# Patient Record
Sex: MALE | Race: BLACK OR AFRICAN AMERICAN | NOT HISPANIC OR LATINO | Employment: UNEMPLOYED | URBAN - METROPOLITAN AREA
[De-identification: names, ages, dates, MRNs, and addresses within clinical notes are randomized per-mention and may not be internally consistent; named-entity substitution may affect disease eponyms.]

---

## 2019-07-29 ENCOUNTER — TELEPHONE (OUTPATIENT)
Dept: SCHEDULING | Facility: IMAGING CENTER | Age: 59
End: 2019-07-29

## 2019-09-04 ENCOUNTER — OFFICE VISIT (OUTPATIENT)
Dept: MEDICAL GROUP | Facility: MEDICAL CENTER | Age: 59
End: 2019-09-04
Attending: NURSE PRACTITIONER
Payer: MEDICAID

## 2019-09-04 VITALS
RESPIRATION RATE: 16 BRPM | TEMPERATURE: 98.3 F | BODY MASS INDEX: 30.07 KG/M2 | DIASTOLIC BLOOD PRESSURE: 90 MMHG | SYSTOLIC BLOOD PRESSURE: 160 MMHG | HEIGHT: 72 IN | HEART RATE: 88 BPM | OXYGEN SATURATION: 93 % | WEIGHT: 222 LBS

## 2019-09-04 DIAGNOSIS — G89.29 CHRONIC MIDLINE LOW BACK PAIN WITH RIGHT-SIDED SCIATICA: ICD-10-CM

## 2019-09-04 DIAGNOSIS — Z13.21 ENCOUNTER FOR VITAMIN DEFICIENCY SCREENING: ICD-10-CM

## 2019-09-04 DIAGNOSIS — Z13.228 SCREENING FOR METABOLIC DISORDER: ICD-10-CM

## 2019-09-04 DIAGNOSIS — Z11.59 NEED FOR HEPATITIS C SCREENING TEST: ICD-10-CM

## 2019-09-04 DIAGNOSIS — Z76.89 ENCOUNTER TO ESTABLISH CARE: ICD-10-CM

## 2019-09-04 DIAGNOSIS — M54.41 CHRONIC MIDLINE LOW BACK PAIN WITH RIGHT-SIDED SCIATICA: ICD-10-CM

## 2019-09-04 DIAGNOSIS — Z13.6 SCREENING FOR CARDIOVASCULAR CONDITION: ICD-10-CM

## 2019-09-04 DIAGNOSIS — E66.9 OBESITY (BMI 30-39.9): ICD-10-CM

## 2019-09-04 DIAGNOSIS — Z12.11 SCREEN FOR COLON CANCER: ICD-10-CM

## 2019-09-04 DIAGNOSIS — I10 ESSENTIAL HYPERTENSION: ICD-10-CM

## 2019-09-04 DIAGNOSIS — Z13.0 SCREENING FOR DEFICIENCY ANEMIA: ICD-10-CM

## 2019-09-04 PROBLEM — M54.42 CHRONIC MIDLINE LOW BACK PAIN WITH LEFT-SIDED SCIATICA: Status: ACTIVE | Noted: 2019-09-04

## 2019-09-04 PROCEDURE — 99204 OFFICE O/P NEW MOD 45 MIN: CPT | Performed by: NURSE PRACTITIONER

## 2019-09-04 PROCEDURE — 99213 OFFICE O/P EST LOW 20 MIN: CPT | Performed by: NURSE PRACTITIONER

## 2019-09-04 RX ORDER — LISINOPRIL 20 MG/1
20 TABLET ORAL DAILY
Qty: 30 TAB | Refills: 5 | Status: SHIPPED | OUTPATIENT
Start: 2019-09-04 | End: 2020-01-08 | Stop reason: SDUPTHER

## 2019-09-04 RX ORDER — GABAPENTIN 100 MG/1
200 CAPSULE ORAL 3 TIMES DAILY
Qty: 360 CAP | Refills: 2 | Status: SHIPPED | OUTPATIENT
Start: 2019-09-04 | End: 2019-11-06

## 2019-09-04 SDOH — HEALTH STABILITY: MENTAL HEALTH: HOW MANY STANDARD DRINKS CONTAINING ALCOHOL DO YOU HAVE ON A TYPICAL DAY?: 1 OR 2

## 2019-09-04 SDOH — HEALTH STABILITY: MENTAL HEALTH: HOW OFTEN DO YOU HAVE A DRINK CONTAINING ALCOHOL?: 2-3 TIMES A WEEK

## 2019-09-04 ASSESSMENT — ENCOUNTER SYMPTOMS
WEIGHT LOSS: 0
DIARRHEA: 0
COUGH: 0
ABDOMINAL PAIN: 0
FEVER: 0
CONSTIPATION: 0
WHEEZING: 0
BLOOD IN STOOL: 0
PALPITATIONS: 0
CHILLS: 0
SHORTNESS OF BREATH: 0

## 2019-09-04 ASSESSMENT — PATIENT HEALTH QUESTIONNAIRE - PHQ9
CLINICAL INTERPRETATION OF PHQ2 SCORE: 1
SUM OF ALL RESPONSES TO PHQ QUESTIONS 1-9: 4
5. POOR APPETITE OR OVEREATING: 0 - NOT AT ALL

## 2019-09-04 NOTE — ASSESSMENT & PLAN NOTE
Prior PCP: Kevin Del Cid [facility]    Other Providers:  Prior ortho sx - Dr. Abdullahi

## 2019-09-04 NOTE — PROGRESS NOTES
Chief Complaint   Patient presents with   • Annual Wellness Visit       Subjective:     HPI:   Sammy Romo is a 59 y.o. male here to establish care, back pain concerns,  and to discuss the evaluation and management of:      Encounter to establish care  Prior PCP: Kevin Del Cid [facility]    Other Providers:  Prior ortho sx - Dr. Abdullahi    Essential hypertension  Onset: 20 years ago  Current treatment: none, ran out  Treatments tried: lisinopril 40mg  Home blood pressure monitoring: none  Associated symptoms: Denies headache, chest pain, vision changes, palpitations    Chronic midline low back pain with right-sided sciatica  This episode with LBP and significant right sciatica began approx 3 months ago while helping someone move.   Unfortunately he lost access to health care at [facility] no longer takes Medicaid patients and was not able to address his low back pain with sciatica earlier.  He reports that the pain is not so much located in his back, but the sciatica pain that is really bothering him.  He points to the L4-L5 dermatome along his right leg when showing me where his nerve pain is located.  Denies saddle anesthesia and incontinence.  Aggravating factors include ambulation and weightbearing.  Alleviating factors-he reports that hot baths morning and night are the only thing that are currently helping, however he does not have any medications.  He has struggled with low back pain in the past and reports a compression fracture with cementing.       ROS  Review of Systems   Constitutional: Negative for chills, fever, malaise/fatigue and weight loss.   Respiratory: Negative for cough, shortness of breath and wheezing.    Cardiovascular: Negative for chest pain, palpitations and leg swelling.   Gastrointestinal: Negative for abdominal pain, blood in stool, constipation and diarrhea.         No Known Allergies    Current medicines (including changes today)  Current Outpatient Medications   Medication Sig Dispense  Refill   • lisinopril (PRINIVIL) 20 MG Tab Take 1 Tab by mouth every day. 30 Tab 5   • gabapentin (NEURONTIN) 100 MG Cap Take 2 Caps by mouth 3 times a day. 360 Cap 2     No current facility-administered medications for this visit.      He  has a past medical history of Chronic back pain greater than 3 months duration, Hyperlipidemia, and Hypertension.  He  has a past surgical history that includes laminotomy and other orthopedic surgery.  Social History     Tobacco Use   • Smoking status: Never Smoker   • Smokeless tobacco: Never Used   Substance Use Topics   • Alcohol use: Yes     Frequency: 2-3 times a week     Drinks per session: 1 or 2     Comment: socially, moderate   • Drug use: No       Family History   Problem Relation Age of Onset   • Hypertension Maternal Grandfather    • Heart Disease Maternal Grandfather      No family status information on file.       Patient Active Problem List    Diagnosis Date Noted   • Encounter to establish care 09/04/2019   • Essential hypertension 09/04/2019   • Chronic midline low back pain with right-sided sciatica 09/04/2019   • Obesity (BMI 30-39.9) 09/04/2019          Objective:     /90   Pulse 88   Temp 36.8 °C (98.3 °F) (Temporal)   Resp 16   Ht 1.829 m (6')   Wt 100.7 kg (222 lb)   SpO2 93%  Body mass index is 30.11 kg/m².    Physical Exam:  Physical Exam   Constitutional: He is oriented to person, place, and time and well-developed, well-nourished, and in no distress. No distress.   HENT:   Head: Normocephalic.   Right Ear: Tympanic membrane and external ear normal.   Left Ear: Tympanic membrane and external ear normal.   Eyes: Pupils are equal, round, and reactive to light. Conjunctivae and EOM are normal.   Neck: Normal range of motion. Neck supple. No tracheal deviation present.   Cardiovascular: Normal rate, regular rhythm, normal heart sounds and intact distal pulses.   Pulmonary/Chest: Effort normal and breath sounds normal.   Abdominal: Soft. Bowel  sounds are normal.   Musculoskeletal: Normal range of motion.   Lymphadenopathy:        Head (right side): No preauricular adenopathy present.        Head (left side): No preauricular adenopathy present.     He has no cervical adenopathy.   Neurological: He is alert and oriented to person, place, and time. He has normal sensation, normal strength and intact cranial nerves. Gait normal.   Skin: Skin is warm and dry.   Psychiatric: Affect and judgment normal.          Assessment and Plan:     The following treatment plan was discussed:    1. Chronic midline low back pain with right-sided sciatica  gabapentin (NEURONTIN) 100 MG Cap  -We will try Neurontin starting with 100 mg nightly.  I have discussed dosing options with the patient and reported that I am comfortable with him going up to 300 mg as long his he does not find this medication makes him to sleepy.  The patient is agreeable.  He is particularly interested in interventions with physiatry.  He reports he is not interested in pain medication as he does not believe this will solve the problem, I agree.    REFERRAL TO PHYSICAL THERAPY Reason for Therapy: Eval/Treat/Report    REFERRAL TO PHYSIATRY (PMR)   2. Essential hypertension  lisinopril (PRINIVIL) 20 MG Tab  -Patient reports that he did previously have to be on 40 mg of lisinopril when his blood pressure was higher.  I have asked him to bring in approximately 5 blood pressure readings to her next visit in 2 months so we can review the efficacy of lisinopril 20 mg daily.   3. Obesity (BMI 30-39.9)  Patient identified as having weight management issue.  Appropriate orders and counseling given.   4. Encounter to establish care     5. Screening for metabolic disorder  Basic Metabolic Panel    HEMOGLOBIN A1C    MICROALBUMIN CREAT RATIO URINE    TSH WITH REFLEX TO FT4   6. Screening for cardiovascular condition  Lipid Profile   7. Screening for deficiency anemia  CBC WITHOUT DIFFERENTIAL   8. Encounter for  vitamin deficiency screening  VITAMIN D,25 HYDROXY   9. Screen for colon cancer  OCCULT BLOOD FECES IMMUNOASSAY (FIT)   10. Need for hepatitis C screening test  HEP C VIRUS ANTIBODY       Any change or worsening of signs or symptoms, patient encouraged to follow-up or report to emergency room for further evaluation. Patient verbalizes understanding and agrees.    Follow-Up: Return in about 2 months (around 11/4/2019).  I will recheck the patient with lab results.      PLEASE NOTE: This dictation was created using voice recognition software. I have made every reasonable attempt to correct obvious errors, but I expect that there are errors of grammar and possibly content that I did not discover before finalizing the note.

## 2019-09-04 NOTE — ASSESSMENT & PLAN NOTE
Onset: 20 years ago  Current treatment: none, ran out  Treatments tried: lisinopril 40mg  Home blood pressure monitoring: none  Associated symptoms: Denies headache, chest pain, vision changes, palpitations

## 2019-09-04 NOTE — ASSESSMENT & PLAN NOTE
This episode with LBP and significant right sciatica began approx 3 months ago while helping someone move.   Unfortunately he lost access to health care at [facility] no longer takes Medicaid patients and was not able to address his low back pain with sciatica earlier.  He reports that the pain is not so much located in his back, but the sciatica pain that is really bothering him.  He points to the L4-L5 dermatome along his right leg when showing me where his nerve pain is located.  Denies saddle anesthesia and incontinence.  Aggravating factors include ambulation and weightbearing.  Alleviating factors-he reports that hot baths morning and night are the only thing that are currently helping, however he does not have any medications.  He has struggled with low back pain in the past and reports a compression fracture with cementing.

## 2019-09-25 ENCOUNTER — HOSPITAL ENCOUNTER (OUTPATIENT)
Dept: LAB | Facility: MEDICAL CENTER | Age: 59
End: 2019-09-25
Attending: NURSE PRACTITIONER
Payer: MEDICAID

## 2019-09-25 DIAGNOSIS — Z11.59 NEED FOR HEPATITIS C SCREENING TEST: ICD-10-CM

## 2019-09-25 DIAGNOSIS — Z13.6 SCREENING FOR CARDIOVASCULAR CONDITION: ICD-10-CM

## 2019-09-25 DIAGNOSIS — Z13.228 SCREENING FOR METABOLIC DISORDER: ICD-10-CM

## 2019-09-25 DIAGNOSIS — Z13.0 SCREENING FOR DEFICIENCY ANEMIA: ICD-10-CM

## 2019-09-25 DIAGNOSIS — Z13.21 ENCOUNTER FOR VITAMIN DEFICIENCY SCREENING: ICD-10-CM

## 2019-09-25 LAB
25(OH)D3 SERPL-MCNC: 4 NG/ML (ref 30–100)
ANION GAP SERPL CALC-SCNC: 7 MMOL/L (ref 0–11.9)
BUN SERPL-MCNC: 14 MG/DL (ref 8–22)
CALCIUM SERPL-MCNC: 9.5 MG/DL (ref 8.5–10.5)
CHLORIDE SERPL-SCNC: 104 MMOL/L (ref 96–112)
CHOLEST SERPL-MCNC: 183 MG/DL (ref 100–199)
CO2 SERPL-SCNC: 30 MMOL/L (ref 20–33)
CREAT SERPL-MCNC: 0.94 MG/DL (ref 0.5–1.4)
ERYTHROCYTE [DISTWIDTH] IN BLOOD BY AUTOMATED COUNT: 46.1 FL (ref 35.9–50)
EST. AVERAGE GLUCOSE BLD GHB EST-MCNC: 128 MG/DL
GLUCOSE SERPL-MCNC: 95 MG/DL (ref 65–99)
HBA1C MFR BLD: 6.1 % (ref 0–5.6)
HCT VFR BLD AUTO: 44 % (ref 42–52)
HCV AB SER QL: NEGATIVE
HDLC SERPL-MCNC: 42 MG/DL
HGB BLD-MCNC: 13.7 G/DL (ref 14–18)
LDLC SERPL CALC-MCNC: 116 MG/DL
MCH RBC QN AUTO: 28.1 PG (ref 27–33)
MCHC RBC AUTO-ENTMCNC: 31.1 G/DL (ref 33.7–35.3)
MCV RBC AUTO: 90.3 FL (ref 81.4–97.8)
PLATELET # BLD AUTO: 211 K/UL (ref 164–446)
PMV BLD AUTO: 12.3 FL (ref 9–12.9)
POTASSIUM SERPL-SCNC: 4 MMOL/L (ref 3.6–5.5)
RBC # BLD AUTO: 4.87 M/UL (ref 4.7–6.1)
SODIUM SERPL-SCNC: 141 MMOL/L (ref 135–145)
TRIGL SERPL-MCNC: 126 MG/DL (ref 0–149)
TSH SERPL DL<=0.005 MIU/L-ACNC: 2.34 UIU/ML (ref 0.38–5.33)
WBC # BLD AUTO: 4.6 K/UL (ref 4.8–10.8)

## 2019-09-25 PROCEDURE — 85027 COMPLETE CBC AUTOMATED: CPT

## 2019-09-25 PROCEDURE — 86803 HEPATITIS C AB TEST: CPT

## 2019-09-25 PROCEDURE — 83036 HEMOGLOBIN GLYCOSYLATED A1C: CPT

## 2019-09-25 PROCEDURE — 36415 COLL VENOUS BLD VENIPUNCTURE: CPT

## 2019-09-25 PROCEDURE — 84443 ASSAY THYROID STIM HORMONE: CPT

## 2019-09-25 PROCEDURE — 82306 VITAMIN D 25 HYDROXY: CPT

## 2019-09-25 PROCEDURE — 80061 LIPID PANEL: CPT

## 2019-09-25 PROCEDURE — 80048 BASIC METABOLIC PNL TOTAL CA: CPT

## 2019-09-27 NOTE — OP THERAPY EVALUATION
"  Outpatient Physical Therapy  INITIAL EVALUATION    [facility]  [address]  [address]  [place] [address]  Phone:  [phone]  Fax:  [phone]    Date of Evaluation: 09/30/2019    Patient: Sammy Romo  YOB: 1960  MRN: [id number]     Referring Provider: PREMA Peters  [address]  [address]  [place] [address]   Referring Diagnosis Lumbago with sciatica, right side [M54.41];Other chronic pain [G89.29]     Time Calculation  Start time: 1330  Stop time: 1430 Time Calculation (min): 60 minutes       Physical Therapy Occurrence Codes    Date of onset of impairment:  9/30/12   Date physical therapy care plan established or reviewed:  9/30/19   Date physical therapy treatment started:  9/30/19          Chief Complaint: Back Problem    Visit Diagnoses     ICD-10-CM   1. Chronic midline low back pain with right-sided sciatica M54.41    G89.29         Subjective:   History of Present Illness:     Date of onset:  9/30/2012    Mechanism of injury:  Patient is a 59 year old male with a PMH including: Essential HTN, hyperlipidemia, obesity, and chronic midline low back pain with right sided sciatica. Has a surgical history of: Laminotomy and lumbar vertebroplasty.     Pt presents to therapy with crutches and  complaints of R LBP with right sided sciatica to R ankle with intermittent radiation to R shoulder. Pt has a hx of LBP since 2012 where he sustained his first compression fracture injury at [place] while helping to move supplies. Pt c/o hx of R LBP with intermittent RLE radiating pain without numbness/tingling; RLE radicular symptoms have become consistent over past 6 months.      Pt reports hx of compression fracture surgeries (2016, 2017, 2018). \"I got relief after the surgeries but it was short lived.\" \"I'm expecting another back surgery. They sent in auth for medicaid for another surgery where they are going to go in and re-do everything.  I've had three back " "surgeries and had physical therapy every time and it didn't work. I had to establish a new primary care. I'm basically here because my doctor wants me to be and I'm going through the motions.\"     Patient's next PCP appointment is on 10/6.         Quality of life:  Poor  Prior level of function:  I've been on disability for the past 8 months.   Sleep disturbance:  Interrupted sleep (Sleeps L side lying )  # Times/Night awakened:  4  Pain:     Current pain ratin    Quality:  Sharp, radiating and burning    Pain timing:  Continuously    Pain Comments::  Aggravating: Prolonged standing >2 min, walking, transfers    Relieving: Hot baths in the morning and night, Gabapentin (20-30% relief)  Social Support:     Lives in:  Apartment (Bottom floor)    Lives with: Friend.  Hand dominance:  Right  Diagnostic Tests:     Diagnostic Tests Comments:  Imaging; Most recent imaging from 3 years ago in Epic system  Lumbar spine x ray:   1. Age-indeterminate T12 compression deformity.  2.  Mild degenerative disc disease.    Pt reports he has had all imaging/records and surgeries (below) completed at [facility].   Completed surgeries at [facility] (2016, 2017, 2018)  Back x ray (2019)  Bone density (within past 3 years)  Compression fracture (6 months ago)    Will attempt to obtain imaging and OP reports  Treatments:     Previous treatment:  Physical therapy (Surgeries)    Treatment Comments:  Physical therapy  X 2 episodes  Activities of Daily Living:     Patient reported ADL status: Patient's current daily routine includes staying at home. \"I live at my friend's house, and she helps me with everything.\" She assists pt with meal preparing, grocery shopping, and laundry. Pt is currently able to complete self care such as grooming and toileting independently. Pt reports he is unable to shower due to pain with prolonged standing and instead takes baths. \"Usually I can set up my own baths too.\" Reports bathtub is handicap " "accessible but toilet does not have bars. Pt reports he received his crutches from a friend who was \"tired of seeing me walk like this.\" Per pt, uses the crutches with all ambulation including indoors at times but does not require crutches when ambulating short distances such as going to the bathroom.     Patient Goals:     Other patient goals:  \"I'm here because I'm going through the motions\"      Past Medical History:   Diagnosis Date   • Chronic back pain greater than 3 months duration    • Hyperlipidemia    • Hypertension      Past Surgical History:   Procedure Laterality Date   • LAMINOTOMY     • OTHER ORTHOPEDIC SURGERY      lumbar vertebroplasty     Social History     Tobacco Use   • Smoking status: Never Smoker   • Smokeless tobacco: Never Used   Substance Use Topics   • Alcohol use: Yes     Frequency: 2-3 times a week     Drinks per session: 1 or 2     Comment: socially, moderate     Family and Occupational History     Socioeconomic History   • Marital status: Single     Spouse name: Not on file   • Number of children: Not on file   • Years of education: Not on file   • Highest education level: Not on file   Occupational History   • Not on file       Objective     Postural Observations  Seated posture: poor    Additional Postural Observation Details  Pt seated in chair throughout entire evaluation. \"I'm sure you have the best intentions, but I know my body and there's nothing you can make me do that won't cause me severe pain.\"    Hip Screen   Hip range of motion within functional limits with the following exceptions: L hip flexion and L knee extension AROM WFL while seated in chair. L ankle PF/DF decreased.   R hip flexion, R knee extension and R ankle PF/DF AROM < 1/2 ROM due to pt's fear of movement. \"I'm not gonna do it all the way because it hurts.\" Inability to assess accurately at this time.   Hip strength within functional limits with the following exceptions: Deferred due to pt's refusal.   \"I " "can't bend down to reach anything, that's funny.\"    Neurological Testing     Reflexes   Left   Patellar (L4): absent (0)  Achilles (S1): absent (0)    Right   Patellar (L4): absent (0)  Achilles (S1): absent (0)    Dermatome testing   Lumbar (left)   All left lumbar dermatomes intact    Lumbar (right)   All right lumbar dermatomes intact    Active Range of Motion     Additional Active Range of Motion Details  Deferred due to pt's refusal.     Functional Assessment     Comments  Transfers:  Pt asked to demonstrate sit>stand as he does at home without crutches. Pt unable to get out of chair with armrests and requires crutches; difficult to determine whether due to fear or lack of strength as unable to test strength this session.     Gait: Pt ambulates with bradykinetic gait and forward flexed posture; bears significant weight on BUE's.         Therapeutic Exercises (CPT 50352):       Therapeutic Exercise Summary: Pt education: Pt educated about importance of maintaining movement in safe, gentle manner to prevent deconditioning, bone fragility, and LB stiffness. Educated about physical therapy attempting to improve pt's mobility and decrease pain with mobility. Educated about gentle strengthening and conditioning to prevent future compression fractures.       Time-based treatments/modalities:          Assessment, Response and Plan:   Impairments: abnormal or restricted ROM, activity intolerance, impaired physical strength, lacks appropriate home exercise program, limited ADL's, limited mobility and pain with function    Assessment details:  Patient has a complex medical hx involving several compression fractures and surgeries (see above). Pt presents to therapy with crutches and  complaints of R LBP with right sided sciatica to R ankle with intermittent radiation to R shoulder. Pt has a hx of LBP since 2012 where he sustained his first compression fracture injury at [facility] while helping to move supplies. Pt c/o hx of R " LBP with intermittent RLE radiating pain without numbness/tingling; RLE radicular symptoms have become consistent over past 6 months.  Pt presents with high fear avoidance behaviors due to pain. Difficult to assess pt fully during evaluation due to these behaviors and pt refusal due to fear of pain. Provided pt education today regarding pain neuroscience and role of PT in functional mobility. Pt may benefit from a trial skilled physical therapy in order to address above impairments in order to improve QOL and return to reported ADL's.     Barriers to therapy:  Psychosocial, non-compliant with treatment regimen and poorly tolerated treatments  Other barriers to therapy:  High fear avoidance behaviors  Prognosis: poor    Prognosis details:  Poor based on high fear avoidance behaviors and past medical hx of multiple compression fractures, multiple surgeries, and failed attempts in PT.  Goals:   Short Term Goals:   1. Pt will be independent with written HEP.    Short term goal time span:  2-4 weeks      Long Term Goals:    1. Pt will be independent with written HEP.  2. Pt will have a RMQ score improvement of 20% (eval:95.83)  3. Pt will be able to stand for 10 min with modification without increase in symptoms in order to perform self care/grooming 50% of the time or greater.  4. Pt will perform sit>stand with modification without increase in symptoms in order to improve QOL 50% of the time or greater.  5. Pt will ambulate 30 ft with modification without increase in symptoms in order to improve QOL and complete ADL's such as using the restroom 50% of the time or greater.     Long term goal time span:  4-6 weeks    Plan:   Therapy options:  Physical therapy treatment to continue  Planned therapy interventions:  Neuromuscular Re-education (CPT 64922), Therapeutic Exercise (CPT 78692) and E Stim Unattended (CPT 21382)  Frequency:  1x week  Duration in visits:  6  Discussed with:  Patient  Plan details:  UPOC:  12/6/19  1x/wk for 6 weeks  Pain neuroscience re-education  L/S stabilization program        Functional Assessment Used  Swapnil Connor Low Back Pain and Disability Score: 95.83      Kristopher Dyson, PT, DPT, [facility]      Referring provider co-signature:  I have reviewed this plan of care and my co-signature certifies the need for services.    Certification Dates:   From  9/30/19 To 12/6/19      Physician Signature: ________________________________ Date: ______________

## 2019-09-30 ENCOUNTER — TELEPHONE (OUTPATIENT)
Dept: MEDICAL GROUP | Facility: MEDICAL CENTER | Age: 59
End: 2019-09-30

## 2019-09-30 ENCOUNTER — PHYSICAL THERAPY (OUTPATIENT)
Dept: PHYSICAL THERAPY | Facility: REHABILITATION | Age: 59
End: 2019-09-30
Attending: NURSE PRACTITIONER
Payer: MEDICAID

## 2019-09-30 DIAGNOSIS — M54.41 CHRONIC MIDLINE LOW BACK PAIN WITH RIGHT-SIDED SCIATICA: ICD-10-CM

## 2019-09-30 DIAGNOSIS — G89.29 CHRONIC MIDLINE LOW BACK PAIN WITH RIGHT-SIDED SCIATICA: ICD-10-CM

## 2019-09-30 PROCEDURE — 97163 PT EVAL HIGH COMPLEX 45 MIN: CPT

## 2019-09-30 RX ORDER — CYCLOBENZAPRINE HCL 5 MG
5-10 TABLET ORAL 3 TIMES DAILY PRN
Qty: 45 TAB | Refills: 1 | Status: SHIPPED | OUTPATIENT
Start: 2019-09-30 | End: 2019-11-06

## 2019-09-30 RX ORDER — ERGOCALCIFEROL 1.25 MG/1
50000 CAPSULE ORAL
Qty: 12 CAP | Refills: 1 | Status: SHIPPED | OUTPATIENT
Start: 2019-09-30 | End: 2019-11-06 | Stop reason: SDUPTHER

## 2019-09-30 SDOH — ECONOMIC STABILITY: GENERAL: QUALITY OF LIFE: POOR

## 2019-09-30 ASSESSMENT — ENCOUNTER SYMPTOMS
AWAKENINGS PER NIGHT: 4
PAIN TIMING: CONTINUOUSLY
QUALITY: RADIATING
QUALITY: BURNING
QUALITY: SHARP
PAIN SCALE: 8

## 2019-09-30 NOTE — TELEPHONE ENCOUNTER
----- Message from PREMA Peters sent at 9/30/2019  7:24 AM PDT -----  Please call pt and let them know that I received his lab results.  He is negative for hepatitis C.  His thyroid function is normal.  His vitamin D level is very low so I will be calling in a weekly vitamin D prescription.  He will only take this medication once a week and we will check his lab in several months.  His average blood sugar is elevated placing him as a prediabetic.  I would like to see him increase his physical activity and decrease his amount of carbohydrate intake, we will recheck his labs in 3-4 months to see how these lifestyle changes have impacted his average blood sugar.  Please asked the patient to schedule appointment in approximately 4 months.  His LDL or bad cholesterol is elevated.  He can improve this number also by increasing his physical activity, decreasing the amount of saturated fat and cholesterol he eats, and increasing the amount of healthy fats like all of oil, avocado, salmon, nuts.  If he has any questions or concerns please asked him to reach  out or schedule an appointment.  Thank you

## 2019-09-30 NOTE — TELEPHONE ENCOUNTER
Relayed results to pt, he verbalized understanding. Pt did state that the gabapentin has given him some relief, but would like to still have an rx for Flexeril. Please advise, thank you

## 2019-09-30 NOTE — RESULT ENCOUNTER NOTE
Please call pt and let them know that I received his lab results.  He is negative for hepatitis C.  His thyroid function is normal.  His vitamin D level is very low so I will be calling in a weekly vitamin D prescription.  He will only take this medication once a week and we will check his lab in several months.  His average blood sugar is elevated placing him as a prediabetic.  I would like to see him increase his physical activity and decrease his amount of carbohydrate intake, we will recheck his labs in 3-4 months to see how these lifestyle changes have impacted his average blood sugar.  Please asked the patient to schedule appointment in approximately 4 months.  His LDL or bad cholesterol is elevated.  He can improve this number also by increasing his physical activity, decreasing the amount of saturated fat and cholesterol he eats, and increasing the amount of healthy fats like all of oil, avocado, salmon, nuts.  If he has any questions or concerns please asked him to reach  out or schedule an appointment.  Thank you

## 2019-09-30 NOTE — PROGRESS NOTES
Called and Flexeril for low back pain.  Medication was discussed at last visit.  We had initiated Neurontin, patient reports some relief but is interested in Flexeril as well.

## 2019-10-14 ENCOUNTER — APPOINTMENT (OUTPATIENT)
Dept: PHYSICAL THERAPY | Facility: REHABILITATION | Age: 59
End: 2019-10-14
Attending: NURSE PRACTITIONER
Payer: MEDICAID

## 2019-10-21 ENCOUNTER — APPOINTMENT (OUTPATIENT)
Dept: PHYSICAL THERAPY | Facility: REHABILITATION | Age: 59
End: 2019-10-21
Attending: NURSE PRACTITIONER
Payer: MEDICAID

## 2019-10-23 ENCOUNTER — APPOINTMENT (OUTPATIENT)
Dept: PHYSICAL THERAPY | Facility: REHABILITATION | Age: 59
End: 2019-10-23
Attending: NURSE PRACTITIONER
Payer: MEDICAID

## 2019-10-28 ENCOUNTER — APPOINTMENT (OUTPATIENT)
Dept: PHYSICAL THERAPY | Facility: REHABILITATION | Age: 59
End: 2019-10-28
Attending: NURSE PRACTITIONER
Payer: MEDICAID

## 2019-11-04 ENCOUNTER — APPOINTMENT (OUTPATIENT)
Dept: PHYSICAL THERAPY | Facility: REHABILITATION | Age: 59
End: 2019-11-04
Attending: NURSE PRACTITIONER
Payer: MEDICAID

## 2019-11-06 ENCOUNTER — APPOINTMENT (OUTPATIENT)
Dept: PHYSICAL THERAPY | Facility: REHABILITATION | Age: 59
End: 2019-11-06
Attending: NURSE PRACTITIONER
Payer: MEDICAID

## 2019-11-06 ENCOUNTER — OFFICE VISIT (OUTPATIENT)
Dept: MEDICAL GROUP | Facility: MEDICAL CENTER | Age: 59
End: 2019-11-06
Attending: NURSE PRACTITIONER
Payer: MEDICAID

## 2019-11-06 VITALS
SYSTOLIC BLOOD PRESSURE: 128 MMHG | DIASTOLIC BLOOD PRESSURE: 80 MMHG | HEIGHT: 72 IN | WEIGHT: 219.4 LBS | RESPIRATION RATE: 16 BRPM | BODY MASS INDEX: 29.72 KG/M2 | TEMPERATURE: 97.4 F | HEART RATE: 88 BPM | OXYGEN SATURATION: 97 %

## 2019-11-06 DIAGNOSIS — G89.29 CHRONIC MIDLINE LOW BACK PAIN WITH RIGHT-SIDED SCIATICA: ICD-10-CM

## 2019-11-06 DIAGNOSIS — M54.41 CHRONIC MIDLINE LOW BACK PAIN WITH RIGHT-SIDED SCIATICA: ICD-10-CM

## 2019-11-06 DIAGNOSIS — I10 ESSENTIAL HYPERTENSION: ICD-10-CM

## 2019-11-06 DIAGNOSIS — F33.1 MODERATE EPISODE OF RECURRENT MAJOR DEPRESSIVE DISORDER (HCC): ICD-10-CM

## 2019-11-06 PROBLEM — F32.9 MAJOR DEPRESSIVE DISORDER: Status: ACTIVE | Noted: 2019-11-06

## 2019-11-06 PROCEDURE — 99214 OFFICE O/P EST MOD 30 MIN: CPT | Performed by: NURSE PRACTITIONER

## 2019-11-06 PROCEDURE — 99213 OFFICE O/P EST LOW 20 MIN: CPT | Performed by: NURSE PRACTITIONER

## 2019-11-06 RX ORDER — CYCLOBENZAPRINE HCL 10 MG
10 TABLET ORAL 3 TIMES DAILY PRN
Qty: 90 TAB | Refills: 3 | Status: SHIPPED | OUTPATIENT
Start: 2019-11-06 | End: 2020-08-07 | Stop reason: SDUPTHER

## 2019-11-06 RX ORDER — ERGOCALCIFEROL 1.25 MG/1
50000 CAPSULE ORAL
Qty: 12 CAP | Refills: 1 | Status: SHIPPED | OUTPATIENT
Start: 2019-11-06 | End: 2020-06-11 | Stop reason: SDUPTHER

## 2019-11-06 RX ORDER — GABAPENTIN 300 MG/1
300 CAPSULE ORAL 3 TIMES DAILY
Qty: 90 CAP | Refills: 5 | Status: SHIPPED | OUTPATIENT
Start: 2019-11-06 | End: 2020-06-11 | Stop reason: SDUPTHER

## 2019-11-06 ASSESSMENT — ENCOUNTER SYMPTOMS
ABDOMINAL PAIN: 0
CONSTIPATION: 0
FEVER: 0
MYALGIAS: 1
COUGH: 0
CHILLS: 0
SHORTNESS OF BREATH: 0
WHEEZING: 0
BLOOD IN STOOL: 0
BACK PAIN: 1
WEIGHT LOSS: 0
DIARRHEA: 0
PALPITATIONS: 0

## 2019-11-06 ASSESSMENT — PATIENT HEALTH QUESTIONNAIRE - PHQ9
SUM OF ALL RESPONSES TO PHQ QUESTIONS 1-9: 19
5. POOR APPETITE OR OVEREATING: 1 - SEVERAL DAYS
CLINICAL INTERPRETATION OF PHQ2 SCORE: 4

## 2019-11-06 NOTE — ASSESSMENT & PLAN NOTE
Checked his BP at a store and got 140/90 and 132/82. He has been taking his lisinopril.  Denies HA and vision changes.

## 2019-11-06 NOTE — PROGRESS NOTES
Chief Complaint   Patient presents with   • Follow-Up   • Results   • Hypertension       Subjective:     HPI:   Sammy Romo is a 59 y.o. male here to discuss the evaluation and management of:        Essential hypertension  Checked his BP at a store and got 140/90 and 132/82. He has been taking his lisinopril.  Denies HA and vision changes.     Chronic midline low back pain with right-sided sciatica  Neurontin and flexeril are helping.  He is off the crutches.  He has a caregiver that was initally full time, but is now doing more on his own.  He takes the flexeril 10mg about TID, it helps.  He reports the gabapentin made a huge difference, but is not working quite as well anymore.  He still does have right-sided sciatica, but the gabapentin helps considerably.  Denies saddle anesthesia and incontinence.    Major depressive disorder  PHQ 9 score today was 19.  Patient has been struggling with low energy, disinterest, slow movement, and fatigue.  He denies HI and SI.  He reports that his pain and financial situation add to his depression.      ROS  Review of Systems   Constitutional: Negative for chills, fever, malaise/fatigue and weight loss.   Respiratory: Negative for cough, shortness of breath and wheezing.    Cardiovascular: Negative for chest pain, palpitations and leg swelling.   Gastrointestinal: Negative for abdominal pain, blood in stool, constipation and diarrhea.   Musculoskeletal: Positive for back pain, joint pain and myalgias.         No Known Allergies    Current medicines (including changes today)  Current Outpatient Medications   Medication Sig Dispense Refill   • sertraline (ZOLOFT) 50 MG Tab Take 1 Tab by mouth every day. Take 1/2 tab once daily for the first week.  Then take one pill once daily. 30 Tab 2   • gabapentin (NEURONTIN) 300 MG Cap Take 1 Cap by mouth 3 times a day. 90 Cap 5   • ergocalciferol (DRISDOL) 35880 UNIT capsule Take 1 Cap by mouth every 7 days. 12 Cap 1   • cyclobenzaprine  (FLEXERIL) 10 MG Tab Take 1 Tab by mouth 3 times a day as needed. 90 Tab 3   • lisinopril (PRINIVIL) 20 MG Tab Take 1 Tab by mouth every day. 30 Tab 5     No current facility-administered medications for this visit.        Social History     Tobacco Use   • Smoking status: Never Smoker   • Smokeless tobacco: Never Used   Substance Use Topics   • Alcohol use: Yes     Frequency: 2-3 times a week     Drinks per session: 1 or 2     Comment: socially, moderate   • Drug use: No       Patient Active Problem List    Diagnosis Date Noted   • Major depressive disorder 11/06/2019   • Encounter to establish care 09/04/2019   • Essential hypertension 09/04/2019   • Chronic midline low back pain with right-sided sciatica 09/04/2019   • Obesity (BMI 30-39.9) 09/04/2019       Family History   Problem Relation Age of Onset   • Hypertension Maternal Grandfather    • Heart Disease Maternal Grandfather           Objective:     /80 (BP Location: Left arm, Patient Position: Sitting, BP Cuff Size: Adult)   Pulse 88   Temp 36.3 °C (97.4 °F)   Resp 16   Ht 1.829 m (6')   Wt 99.5 kg (219 lb 6.4 oz)   SpO2 97%  Body mass index is 29.76 kg/m².    Physical Exam:  Physical Exam   Constitutional: He is oriented to person, place, and time and well-developed, well-nourished, and in no distress. No distress.   HENT:   Head: Normocephalic.   Right Ear: Tympanic membrane and external ear normal.   Left Ear: Tympanic membrane and external ear normal.   Eyes: Pupils are equal, round, and reactive to light. Conjunctivae and EOM are normal.   Neck: Normal range of motion. Neck supple. No tracheal deviation present.   Cardiovascular: Normal rate, regular rhythm, normal heart sounds and intact distal pulses.   Pulmonary/Chest: Effort normal and breath sounds normal.   Abdominal: Soft. Bowel sounds are normal.   Musculoskeletal: Normal range of motion.   Lymphadenopathy:        Head (right side): No preauricular adenopathy present.        Head  (left side): No preauricular adenopathy present.     He has no cervical adenopathy.   Neurological: He is alert and oriented to person, place, and time. He has normal sensation, normal strength and intact cranial nerves. Gait normal.   Skin: Skin is warm and dry.   Psychiatric: Affect and judgment normal.       Assessment and Plan:     The following treatment plan was discussed:    1. Essential hypertension  - continue lisinopril.  I have asked the patient to check his blood pressure in the community several times between now and her follow-up appointment in 1 month.  We will consider adding another agent or increasing his lisinopril dose at the next visit if he does have elevated readings.  I would also like to get better control of his pain as that may be a contributing factor.   2. Chronic midline low back pain with right-sided sciatica  gabapentin (NEURONTIN) 300 MG Cap  -We will increase his Neurontin dose to 300 mg 3 times daily.  He did see some benefit with 200 mg daily, although the benefit has slightly decreased.  He denies somnolence.  I have increased his Flexeril dose to 10 mg 3 times daily as needed muscle spasm/pain as he finds to 10 mg dose more effective and nonsedating.    cyclobenzaprine (FLEXERIL) 10 MG Tab   3. Moderate episode of recurrent major depressive disorder (HCC)  Patient has been identified as having a positive depression screening. Appropriate orders and counseling have been given.    sertraline (ZOLOFT) 50 MG Tab  -We will initiate zoloft today for his depression.  I will take a half a tab daily for 1 week, then increase to a full tab daily.  We will follow-up in 1 month to check on the efficacy of this medication.  I also provided the patient with [place] information as transportation stress adds to his depression.  Also hopefully finding better control of his pain will improve his mood.  Counseling offered, patient not interested at this time.       Any change or worsening of signs or  symptoms, patient encouraged to follow-up or report to emergency room for further evaluation. Patient verbalizes understanding and agrees.    Follow-Up: Return in about 4 weeks (around 12/4/2019) for Hypertension, Depression.      PLEASE NOTE: This dictation was created using voice recognition software. I have made every reasonable attempt to correct obvious errors, but I expect that there are errors of grammar and possibly content that I did not discover before finalizing the note.

## 2019-11-06 NOTE — ASSESSMENT & PLAN NOTE
PHQ 9 score today was 19.  Patient has been struggling with low energy, disinterest, slow movement, and fatigue.  He denies HI and SI.  He reports that his pain and financial situation add to his depression.

## 2019-11-11 ENCOUNTER — APPOINTMENT (OUTPATIENT)
Dept: PHYSICAL THERAPY | Facility: REHABILITATION | Age: 59
End: 2019-11-11
Attending: NURSE PRACTITIONER
Payer: MEDICAID

## 2019-12-05 ENCOUNTER — OFFICE VISIT (OUTPATIENT)
Dept: MEDICAL GROUP | Facility: MEDICAL CENTER | Age: 59
End: 2019-12-05
Attending: NURSE PRACTITIONER
Payer: MEDICAID

## 2019-12-05 VITALS
DIASTOLIC BLOOD PRESSURE: 82 MMHG | SYSTOLIC BLOOD PRESSURE: 122 MMHG | OXYGEN SATURATION: 92 % | HEIGHT: 72 IN | BODY MASS INDEX: 29.53 KG/M2 | HEART RATE: 90 BPM | RESPIRATION RATE: 16 BRPM | TEMPERATURE: 99 F | WEIGHT: 218 LBS

## 2019-12-05 DIAGNOSIS — H61.21 IMPACTED CERUMEN OF RIGHT EAR: ICD-10-CM

## 2019-12-05 DIAGNOSIS — F33.1 MODERATE EPISODE OF RECURRENT MAJOR DEPRESSIVE DISORDER (HCC): ICD-10-CM

## 2019-12-05 DIAGNOSIS — F41.9 ANXIETY: ICD-10-CM

## 2019-12-05 PROCEDURE — 69210 REMOVE IMPACTED EAR WAX UNI: CPT | Performed by: NURSE PRACTITIONER

## 2019-12-05 PROCEDURE — 99213 OFFICE O/P EST LOW 20 MIN: CPT | Performed by: NURSE PRACTITIONER

## 2019-12-05 PROCEDURE — 99214 OFFICE O/P EST MOD 30 MIN: CPT | Mod: 25 | Performed by: NURSE PRACTITIONER

## 2019-12-05 RX ORDER — HYDROXYZINE HYDROCHLORIDE 25 MG/1
25 TABLET, FILM COATED ORAL 3 TIMES DAILY PRN
Qty: 60 TAB | Refills: 0 | Status: SHIPPED | OUTPATIENT
Start: 2019-12-05 | End: 2020-01-08 | Stop reason: SDUPTHER

## 2019-12-05 RX ORDER — BUSPIRONE HYDROCHLORIDE 10 MG/1
5 TABLET ORAL 2 TIMES DAILY
Qty: 30 TAB | Refills: 0 | Status: SHIPPED | OUTPATIENT
Start: 2019-12-05 | End: 2020-01-08 | Stop reason: SDUPTHER

## 2019-12-05 ASSESSMENT — ENCOUNTER SYMPTOMS
ABDOMINAL PAIN: 0
DEPRESSION: 1
DIARRHEA: 0
NERVOUS/ANXIOUS: 1
CONSTIPATION: 0
BLOOD IN STOOL: 0
FEVER: 0
SHORTNESS OF BREATH: 0
CHILLS: 0
MEMORY LOSS: 1
COUGH: 0
PALPITATIONS: 0
WHEEZING: 0
WEIGHT LOSS: 0

## 2019-12-05 NOTE — ASSESSMENT & PLAN NOTE
Patient complains of decreased hearing in his right ear, he thinks that his ear is clogged with earwax.

## 2019-12-05 NOTE — PROGRESS NOTES
Chief Complaint   Patient presents with   • Depression   • Cerumen Impaction   • Follow-Up       Subjective:     HPI:   Sammy Romo is a 59 y.o. male here to discuss the evaluation and management of:    The patient's caregiver, Aida, is present for the visit today per patient's request.    Major depressive disorder  Did not like the zoloft, he took it for several weeks.  He stopped taking it b/c it was making him feel more anxious.  He is having a lot of anxiety.  He is easily agitated and can have trouble calming down.  He becomes shaky and somewhat diaphoretic with anxiety.  His caregiver has reported this.  She reports he is more anxious and forgetful.  They are unsure if he is having panic attacks.  He is forgetting appointment, names of people, where he puts things.  He denies getting lost or forgetting how to do activities he previously knew how to do.  His caregiver did report that he forgot if he had taken his medicine or not the other day.  She is concerned that he will duplicate doses.    Anxiety  Se MDD discussion above.    Impacted cerumen of right ear  Patient complains of decreased hearing in his right ear, he thinks that his ear is clogged with earwax.      ROS  Review of Systems   Constitutional: Negative for chills, fever, malaise/fatigue and weight loss.   Respiratory: Negative for cough, shortness of breath and wheezing.    Cardiovascular: Negative for chest pain, palpitations and leg swelling.   Gastrointestinal: Negative for abdominal pain, blood in stool, constipation and diarrhea.   Psychiatric/Behavioral: Positive for depression and memory loss. Negative for suicidal ideas. The patient is nervous/anxious.          No Known Allergies    Current medicines (including changes today)  Current Outpatient Medications   Medication Sig Dispense Refill   • busPIRone (BUSPAR) 10 MG Tab tablet Take 0.5 Tabs by mouth 2 times a day. 30 Tab 0   • hydrOXYzine HCl (ATARAX) 25 MG Tab Take 1 Tab by mouth 3  times a day as needed for Anxiety. 60 Tab 0   • gabapentin (NEURONTIN) 300 MG Cap Take 1 Cap by mouth 3 times a day. 90 Cap 5   • ergocalciferol (DRISDOL) 48336 UNIT capsule Take 1 Cap by mouth every 7 days. 12 Cap 1   • cyclobenzaprine (FLEXERIL) 10 MG Tab Take 1 Tab by mouth 3 times a day as needed. 90 Tab 3   • lisinopril (PRINIVIL) 20 MG Tab Take 1 Tab by mouth every day. 30 Tab 5     No current facility-administered medications for this visit.        Social History     Tobacco Use   • Smoking status: Never Smoker   • Smokeless tobacco: Never Used   Substance Use Topics   • Alcohol use: Yes     Frequency: 2-3 times a week     Drinks per session: 1 or 2     Comment: socially, moderate   • Drug use: No       Patient Active Problem List    Diagnosis Date Noted   • Anxiety 12/05/2019   • Impacted cerumen of right ear 12/05/2019   • Major depressive disorder 11/06/2019   • Encounter to [facility] care 09/04/2019   • Essential hypertension 09/04/2019   • Chronic midline low back pain with right-sided sciatica 09/04/2019   • Obesity (BMI 30-39.9) 09/04/2019       Family History   Problem Relation Age of Onset   • Hypertension Maternal Grandfather    • Heart Disease Maternal Grandfather           Objective:     /82 (BP Location: Left arm, Patient Position: Sitting, BP Cuff Size: Adult)   Pulse 90   Temp 37.2 °C (99 °F) (Temporal)   Resp 16   Ht 1.829 m (6')   Wt 98.9 kg (218 lb)   SpO2 92%  Body mass index is 29.57 kg/m².    Physical Exam:  Physical Exam   Constitutional: He is oriented to person, place, and time and well-developed, well-nourished, and in no distress. No distress.   HENT:   Head: Normocephalic.   Left Ear: Tympanic membrane and external ear normal.   Right ear occluded with cerumen.   Eyes: Pupils are equal, round, and reactive to light. Conjunctivae and EOM are normal.   Neck: Normal range of motion. Neck supple. No tracheal deviation present.   Cardiovascular: Normal rate, regular rhythm,  normal heart sounds and intact distal pulses.   Pulmonary/Chest: Effort normal and breath sounds normal.   Abdominal: Soft. Bowel sounds are normal.   Musculoskeletal: Normal range of motion.   Lymphadenopathy:        Head (right side): No preauricular adenopathy present.        Head (left side): No preauricular adenopathy present.     He has no cervical adenopathy.   Neurological: He is alert and oriented to person, place, and time. He has normal sensation, normal strength and intact cranial nerves. Gait normal.   Skin: Skin is warm and dry.   Psychiatric: Affect and judgment normal.       Assessment and Plan:     The following treatment plan was discussed:    1. Anxiety  REFERRAL TO PSYCHIATRY  BUSPAR 5MG BID  HYDROXYZINE 25MG TID PRN  -Chronic problem, worsening.  His anxiety and depression are severely impacting his daily life.  Also concerned if his memory loss is related to his depression and anxiety.  He did not tolerate Zoloft.  We will initiate BuSpar 5 mg twice daily and hydroxyzine 25 mg 3 times daily as needed.  I have asked him not to use his hydroxyzine while using his Flexeril.  Patient and caregiver agreeable. I placed an urgent referral for evaluation by psychiatrist.  Patient is agreeable.  We discussed the referral process.    2. Moderate episode of recurrent major depressive disorder (HCC)  REFERRAL TO PSYCHIATRY  -Chronic problem, worsening.  See above for discussion.   3. Impacted cerumen of right ear  Procedure note    Procedure: Cerumen removal  Indications: Impacted cerumen    Patient was prepped and right ear was lavaged by medical assistant.  Residual wax was curetted by APRN with resolution of impaction. TM visualized with otoscope.    Code [id number]       Any change or worsening of signs or symptoms, patient encouraged to follow-up or report to emergency room for further evaluation. Patient verbalizes understanding and agrees.    Follow-Up: Return in about 2 weeks (around 12/19/2019) for  ANXIETY MED CHECK.      PLEASE NOTE: This dictation was created using voice recognition software. I have made every reasonable attempt to correct obvious errors, but I expect that there are errors of grammar and possibly content that I did not discover before finalizing the note.

## 2019-12-05 NOTE — ASSESSMENT & PLAN NOTE
Did not like the zoloft, he took it for several weeks.  He stopped taking it b/c it was making him feel more anxious.  He is having a lot of anxiety.  He is easily agitated and can have trouble calming down.  He becomes shaky and somewhat diaphoretic with anxiety.  His caregiver has reported this.  She reports he is more anxious and forgetful.  They are unsure if he is having panic attacks.  He is forgetting appointment, names of people, where he puts things.  He denies getting lost or forgetting how to do activities he previously knew how to do.  His caregiver did report that he forgot if he had taken his medicine or not the other day.  She is concerned that he will duplicate doses.

## 2019-12-09 ENCOUNTER — TELEPHONE (OUTPATIENT)
Dept: PHYSICAL THERAPY | Facility: REHABILITATION | Age: 59
End: 2019-12-09

## 2020-01-08 ENCOUNTER — OFFICE VISIT (OUTPATIENT)
Dept: MEDICAL GROUP | Facility: MEDICAL CENTER | Age: 60
End: 2020-01-08
Attending: NURSE PRACTITIONER
Payer: MEDICAID

## 2020-01-08 VITALS
HEIGHT: 72 IN | OXYGEN SATURATION: 91 % | HEART RATE: 85 BPM | WEIGHT: 212.4 LBS | SYSTOLIC BLOOD PRESSURE: 130 MMHG | DIASTOLIC BLOOD PRESSURE: 80 MMHG | RESPIRATION RATE: 16 BRPM | BODY MASS INDEX: 28.77 KG/M2 | TEMPERATURE: 97.9 F

## 2020-01-08 DIAGNOSIS — F41.9 ANXIETY: ICD-10-CM

## 2020-01-08 DIAGNOSIS — I10 ESSENTIAL HYPERTENSION: ICD-10-CM

## 2020-01-08 PROCEDURE — 99214 OFFICE O/P EST MOD 30 MIN: CPT | Performed by: NURSE PRACTITIONER

## 2020-01-08 PROCEDURE — 99213 OFFICE O/P EST LOW 20 MIN: CPT | Performed by: NURSE PRACTITIONER

## 2020-01-08 RX ORDER — BUSPIRONE HYDROCHLORIDE 10 MG/1
10 TABLET ORAL 2 TIMES DAILY
Qty: 60 TAB | Refills: 3 | Status: SHIPPED
Start: 2020-01-08 | End: 2020-06-11

## 2020-01-08 RX ORDER — HYDROXYZINE HYDROCHLORIDE 25 MG/1
25 TABLET, FILM COATED ORAL 3 TIMES DAILY PRN
Qty: 60 TAB | Refills: 3 | Status: SHIPPED
Start: 2020-01-08 | End: 2020-06-11

## 2020-01-08 RX ORDER — BUSPIRONE HYDROCHLORIDE 10 MG/1
10 TABLET ORAL 2 TIMES DAILY
Qty: 60 TAB | Refills: 3 | Status: SHIPPED | OUTPATIENT
Start: 2020-01-08 | End: 2020-01-08

## 2020-01-08 RX ORDER — LISINOPRIL 20 MG/1
20 TABLET ORAL DAILY
Qty: 90 TAB | Refills: 2 | Status: SHIPPED | OUTPATIENT
Start: 2020-01-08 | End: 2020-06-11 | Stop reason: SDUPTHER

## 2020-01-08 ASSESSMENT — ENCOUNTER SYMPTOMS
WEIGHT LOSS: 0
CONSTIPATION: 0
SHORTNESS OF BREATH: 0
NERVOUS/ANXIOUS: 1
PALPITATIONS: 0
FEVER: 0
DEPRESSION: 1
WHEEZING: 0
DIARRHEA: 0
COUGH: 0
ABDOMINAL PAIN: 0
CHILLS: 0
BLOOD IN STOOL: 0
BACK PAIN: 1

## 2020-01-08 NOTE — PROGRESS NOTES
Chief Complaint   Patient presents with   • Anxiety   • Follow-Up   • Medication Refill       Subjective:     HPI:   Sammy Romo is a 59 y.o. male here to discuss the evaluation and management of:        Anxiety  Last month we started buspar BID and hydroxyzine.  He did not have refills so he had been off both for about 2 weeks.  He reports some improvement in his anxiety.  He would like to go up on the dose of buspar. He reports ongoing disinterest.  He reports continued forgetful.        ROS  Review of Systems   Constitutional: Negative for chills, fever, malaise/fatigue and weight loss.   Respiratory: Negative for cough, shortness of breath and wheezing.    Cardiovascular: Negative for chest pain, palpitations and leg swelling.   Gastrointestinal: Negative for abdominal pain, blood in stool, constipation and diarrhea.   Musculoskeletal: Positive for back pain and joint pain.   Psychiatric/Behavioral: Positive for depression. Negative for suicidal ideas. The patient is nervous/anxious.          No Known Allergies    Current medicines (including changes today)  Current Outpatient Medications   Medication Sig Dispense Refill   • hydrOXYzine HCl (ATARAX) 25 MG Tab Take 1 Tab by mouth 3 times a day as needed for Anxiety. 60 Tab 3   • busPIRone (BUSPAR) 10 MG Tab tablet Take 1 Tab by mouth 2 times a day. For Anxiety 60 Tab 3   • gabapentin (NEURONTIN) 300 MG Cap Take 1 Cap by mouth 3 times a day. 90 Cap 5   • ergocalciferol (DRISDOL) 38551 UNIT capsule Take 1 Cap by mouth every 7 days. 12 Cap 1   • cyclobenzaprine (FLEXERIL) 10 MG Tab Take 1 Tab by mouth 3 times a day as needed. 90 Tab 3   • lisinopril (PRINIVIL) 20 MG Tab Take 1 Tab by mouth every day. 30 Tab 5     No current facility-administered medications for this visit.        Social History     Tobacco Use   • Smoking status: Never Smoker   • Smokeless tobacco: Never Used   Substance Use Topics   • Alcohol use: Yes     Frequency: 2-3 times a week     Drinks per  session: 1 or 2     Comment: socially, moderate   • Drug use: No       Patient Active Problem List    Diagnosis Date Noted   • Anxiety 12/05/2019   • Impacted cerumen of right ear 12/05/2019   • Major depressive disorder 11/06/2019   • Encounter to establish care 09/04/2019   • Essential hypertension 09/04/2019   • Chronic midline low back pain with right-sided sciatica 09/04/2019   • Obesity (BMI 30-39.9) 09/04/2019       Family History   Problem Relation Age of Onset   • Hypertension Maternal Grandfather    • Heart Disease Maternal Grandfather           Objective:     /80 (BP Location: Left arm, Patient Position: Sitting, BP Cuff Size: Adult)   Pulse 85   Temp 36.6 °C (97.9 °F) (Temporal)   Resp 16   Ht 1.829 m (6')   Wt 96.3 kg (212 lb 6.4 oz)   SpO2 91%  Body mass index is 28.81 kg/m².    Physical Exam:  Physical Exam   Constitutional: He is oriented to person, place, and time and well-developed, well-nourished, and in no distress. No distress.   HENT:   Head: Normocephalic.   Right Ear: Tympanic membrane and external ear normal.   Left Ear: Tympanic membrane and external ear normal.   Eyes: Pupils are equal, round, and reactive to light. Conjunctivae and EOM are normal.   Neck: Normal range of motion. Neck supple. No tracheal deviation present.   Cardiovascular: Normal rate, regular rhythm, normal heart sounds and intact distal pulses.   Pulmonary/Chest: Effort normal and breath sounds normal.   Abdominal: Soft. Bowel sounds are normal.   Musculoskeletal: Normal range of motion.   Lymphadenopathy:        Head (right side): No preauricular adenopathy present.        Head (left side): No preauricular adenopathy present.     He has no cervical adenopathy.   Neurological: He is alert and oriented to person, place, and time. He has normal sensation, normal strength and intact cranial nerves. Gait normal.   Skin: Skin is warm and dry.   Psychiatric: Affect and judgment normal.       Assessment and Plan:      The following treatment plan was discussed:    1. Anxiety  hydrOXYzine HCl (ATARAX) 25 MG Tab    busPIRone (BUSPAR) 10 MG Tab tablet    -Chronic problem, improving.  Patient did see some improvement while taking BuSpar twice daily and hydroxyzine as needed.  He reports he would like to increase the dose to 10 mg twice daily, I am agreeable.  We discussed the importance of not mixing his Flexeril with either hydroxyzine or gabapentin to avoid somnolence, patient verbalized understanding.  At her next visit we will consider adding a antidepressant agent as I think that his depression is significantly impacting his daily life, patient is agreeable.       Any change or worsening of signs or symptoms, patient encouraged to follow-up or report to emergency room for further evaluation. Patient verbalizes understanding and agrees.    Follow-Up: Return in about 4 weeks (around 2/5/2020) for Depression.      PLEASE NOTE: This dictation was created using voice recognition software. I have made every reasonable attempt to correct obvious errors, but I expect that there are errors of grammar and possibly content that I did not discover before finalizing the note.

## 2020-01-08 NOTE — PATIENT INSTRUCTIONS
1. Pain- schedule an appt  [facility]   [address]  [facility] [address]  Ph: [phone]

## 2020-01-08 NOTE — ASSESSMENT & PLAN NOTE
Last month we started buspar BID and hydroxyzine.  He did not have refills so he had been off both for about 2 weeks.  He reports some improvement in his anxiety.  He would like to go up on the dose of buspar. He reports ongoing disinterest.  He reports continued forgetful.

## 2020-02-10 ENCOUNTER — OFFICE VISIT (OUTPATIENT)
Dept: MEDICAL GROUP | Facility: MEDICAL CENTER | Age: 60
End: 2020-02-10
Attending: NURSE PRACTITIONER
Payer: MEDICAID

## 2020-02-10 VITALS
HEART RATE: 80 BPM | BODY MASS INDEX: 28.33 KG/M2 | TEMPERATURE: 98.1 F | OXYGEN SATURATION: 96 % | SYSTOLIC BLOOD PRESSURE: 118 MMHG | HEIGHT: 72 IN | RESPIRATION RATE: 16 BRPM | DIASTOLIC BLOOD PRESSURE: 82 MMHG | WEIGHT: 209.2 LBS

## 2020-02-10 DIAGNOSIS — F33.1 MODERATE EPISODE OF RECURRENT MAJOR DEPRESSIVE DISORDER (HCC): ICD-10-CM

## 2020-02-10 DIAGNOSIS — F41.9 ANXIETY: ICD-10-CM

## 2020-02-10 PROCEDURE — 99213 OFFICE O/P EST LOW 20 MIN: CPT | Performed by: NURSE PRACTITIONER

## 2020-02-10 RX ORDER — AMITRIPTYLINE HYDROCHLORIDE 10 MG/1
10 TABLET, FILM COATED ORAL
Qty: 30 TAB | Refills: 2 | Status: SHIPPED
Start: 2020-02-10 | End: 2020-06-11

## 2020-02-10 ASSESSMENT — ENCOUNTER SYMPTOMS
ABDOMINAL PAIN: 0
WHEEZING: 0
WEIGHT LOSS: 0
DIARRHEA: 0
BLOOD IN STOOL: 0
COUGH: 0
CONSTIPATION: 0
FEVER: 0
PALPITATIONS: 0
SHORTNESS OF BREATH: 0
CHILLS: 0

## 2020-02-10 NOTE — PROGRESS NOTES
Chief Complaint   Patient presents with   • Anxiety       Subjective:     HPI:   Sammy Romo is a 59 y.o. male here to discuss the evaluation and management of:        Anxiety  Reports that he has been taking buspar BID and has noticed an improvement in his anxiety.  He takes the hydroxyzine prn, it is helpful.  He reports he is careful not to mix his hydroxyzine with his muscle relaxer as we discussed at our previous visits.    Major depressive disorder  He did not like zoloft.  It was increasing his anxiety, so he stopped it.  He does not want any medication that is similar to zoloft.  He does have chronic pain.  Denies HI and SI. He continues to have memory issues, but he thinks they are imrpoving.      ROS  Review of Systems   Constitutional: Negative for chills, fever, malaise/fatigue and weight loss.   Respiratory: Negative for cough, shortness of breath and wheezing.    Cardiovascular: Negative for chest pain, palpitations and leg swelling.   Gastrointestinal: Negative for abdominal pain, blood in stool, constipation and diarrhea.         No Known Allergies    Current medicines (including changes today)  Current Outpatient Medications   Medication Sig Dispense Refill   • amitriptyline (ELAVIL) 10 MG Tab Take 1 Tab by mouth every bedtime. 30 Tab 2   • hydrOXYzine HCl (ATARAX) 25 MG Tab Take 1 Tab by mouth 3 times a day as needed for Anxiety. 60 Tab 3   • busPIRone (BUSPAR) 10 MG Tab tablet Take 1 Tab by mouth 2 times a day. For Anxiety 60 Tab 3   • lisinopril (PRINIVIL) 20 MG Tab Take 1 Tab by mouth every day. For high blood pressure 90 Tab 2   • gabapentin (NEURONTIN) 300 MG Cap Take 1 Cap by mouth 3 times a day. 90 Cap 5   • ergocalciferol (DRISDOL) 81124 UNIT capsule Take 1 Cap by mouth every 7 days. 12 Cap 1   • cyclobenzaprine (FLEXERIL) 10 MG Tab Take 1 Tab by mouth 3 times a day as needed. 90 Tab 3     No current facility-administered medications for this visit.        Social History     Tobacco Use    • Smoking status: Never Smoker   • Smokeless tobacco: Never Used   Substance Use Topics   • Alcohol use: Yes     Frequency: 2-3 times a week     Drinks per session: 1 or 2     Comment: socially, moderate   • Drug use: No       Patient Active Problem List    Diagnosis Date Noted   • Anxiety 12/05/2019   • Impacted cerumen of right ear 12/05/2019   • Major depressive disorder 11/06/2019   • Encounter to establish care 09/04/2019   • Essential hypertension 09/04/2019   • Chronic midline low back pain with right-sided sciatica 09/04/2019   • Obesity (BMI 30-39.9) 09/04/2019       Family History   Problem Relation Age of Onset   • Hypertension Maternal Grandfather    • Heart Disease Maternal Grandfather           Objective:     /82 (BP Location: Left arm, Patient Position: Sitting, BP Cuff Size: Adult)   Pulse 80   Temp 36.7 °C (98.1 °F) (Temporal)   Resp 16   Ht 1.829 m (6')   Wt 94.9 kg (209 lb 3.2 oz)   SpO2 96%  Body mass index is 28.37 kg/m².    Physical Exam:  Physical Exam   Constitutional: He is oriented to person, place, and time and well-developed, well-nourished, and in no distress. No distress.   HENT:   Head: Normocephalic.   Right Ear: Tympanic membrane and external ear normal.   Left Ear: Tympanic membrane and external ear normal.   Eyes: Pupils are equal, round, and reactive to light. Conjunctivae and EOM are normal.   Neck: Normal range of motion. Neck supple. No tracheal deviation present.   Cardiovascular: Normal rate, regular rhythm, normal heart sounds and intact distal pulses.   Pulmonary/Chest: Effort normal and breath sounds normal.   Abdominal: Soft. Bowel sounds are normal.   Musculoskeletal: Normal range of motion.   Lymphadenopathy:        Head (right side): No preauricular adenopathy present.        Head (left side): No preauricular adenopathy present.     He has no cervical adenopathy.   Neurological: He is alert and oriented to person, place, and time. He has normal sensation,  normal strength and intact cranial nerves. Gait normal.   Skin: Skin is warm and dry.   Psychiatric: Affect and judgment normal.       Assessment and Plan:     The following treatment plan was discussed:    1. Anxiety   chronic problem, improving.  Patient reports he would like to stay on the BuSpar 10 mg twice daily.  We discussed that there is a 3 times daily option if he feels that this would be be helpful in the future.  I reiterated the importance of not mixing his hydroxyzine with other sedating medications like Flexeril, patient verbalized understanding and reports that he does not mix the medications.  We will continue BuSpar 10 mg twice daily and hydroxyzine 25 mg 3 times daily as needed anxiety.   2. Moderate episode of recurrent major depressive disorder (HCC)  amitriptyline (ELAVIL) 10 MG Tab  -Chronic problem, unstable.  Patient would like to stay away from SSRIs due to his bad experience with Zoloft, but does report ongoing depression and pain.  We will start him on amitriptyline 10 mg nightly.  I have instructed the patient that initially we are just making sure that this medication does not cause any side effects and that it will takes weeks for us to see the full benefit.  I have asked the patient to reach out to me in the next several weeks and let me know if he thinks we need to increase his Elavil to 25 mg nightly.  I let him know that if this is the case I will increase his dose to 25 mg nightly and we will schedule a follow-up for 6 weeks from that dose change.  Patient verbalized understanding.  Patient provided with written information regarding amitriptyline.       Any change or worsening of signs or symptoms, patient encouraged to follow-up or report to emergency room for further evaluation. Patient verbalizes understanding and agrees.    Follow-Up: Return in about 6 weeks (around 3/23/2020) for Depression.      PLEASE NOTE: This dictation was created using voice recognition software. I  have made every reasonable attempt to correct obvious errors, but I expect that there are errors of grammar and possibly content that I did not discover before finalizing the note.

## 2020-02-10 NOTE — ASSESSMENT & PLAN NOTE
Reports that he has been taking buspar BID and has noticed an improvement in his anxiety.  He takes the hydroxyzine prn, it is helpful.  He reports he is careful not to mix his hydroxyzine with his muscle relaxer as we discussed at our previous visits.

## 2020-02-10 NOTE — ASSESSMENT & PLAN NOTE
He did not like zoloft.  It was increasing his anxiety, so he stopped it.  He does not want any medication that is similar to zoloft.  He does have chronic pain.  Denies HI and SI. He continues to have memory issues, but he thinks they are imrpoving.

## 2020-02-10 NOTE — PATIENT INSTRUCTIONS
1. Call me in a week or so if you like the amitriptyline and think you need a higher dose.  I will send the 25 mg to your pharmacy, then have you schedule a follow up appt with me for 6 weeks after the dose change.         Amitriptyline tablets  What is this medicine?  AMITRIPTYLINE (a lexi TRIP ti joshua) is used to treat depression.  This medicine may be used for other purposes; ask your health care provider or pharmacist if you have questions.  COMMON BRAND NAME(S): Elavil, Vanatrip  What should I tell my health care provider before I take this medicine?  They need to know if you have any of these conditions:  -an alcohol problem  -asthma, difficulty breathing  -bipolar disorder or schizophrenia  -difficulty passing urine, prostate trouble  -glaucoma  -heart disease or previous heart attack  -liver disease  -over active thyroid  -seizures  -thoughts or plans of suicide, a previous suicide attempt, or family history of suicide attempt  -an unusual or allergic reaction to amitriptyline, other medicines, foods, dyes, or preservatives  -pregnant or trying to get pregnant  -breast-feeding  How should I use this medicine?  Take this medicine by mouth with a drink of water. Follow the directions on the prescription label. You can take the tablets with or without food. Take your medicine at regular intervals. Do not take it more often than directed. Do not stop taking this medicine suddenly except upon the advice of your doctor. Stopping this medicine too quickly may cause serious side effects or your condition may worsen.  A special MedGuide will be given to you by the pharmacist with each prescription and refill. Be sure to read this information carefully each time.  Talk to your pediatrician regarding the use of this medicine in children. Special care may be needed.  Overdosage: If you think you have taken too much of this medicine contact a poison control center or emergency room at once.  NOTE: This medicine is only  for you. Do not share this medicine with others.  What if I miss a dose?  If you miss a dose, take it as soon as you can. If it is almost time for your next dose, take only that dose. Do not take double or extra doses.  What may interact with this medicine?  Do not take this medicine with any of the following medications:  -arsenic trioxide  -certain medicines used to regulate abnormal heartbeat or to treat other heart conditions  -cisapride  -droperidol  -halofantrine  -linezolid  -MAOIs like Carbex, Eldepryl, Marplan, Nardil, and Parnate  -methylene blue  -other medicines for mental depression  -phenothiazines like perphenazine, thioridazine and chlorpromazine  -pimozide  -probucol  -procarbazine  -sparfloxacin  -Stevenson's Wort  -ziprasidone  This medicine may also interact with the following medications:  -atropine and related drugs like hyoscyamine, scopolamine, tolterodine and others  -barbiturate medicines for inducing sleep or treating seizures, like phenobarbital  -cimetidine  -disulfiram  -ethchlorvynol  -thyroid hormones such as levothyroxine  This list may not describe all possible interactions. Give your health care provider a list of all the medicines, herbs, non-prescription drugs, or dietary supplements you use. Also tell them if you smoke, drink alcohol, or use illegal drugs. Some items may interact with your medicine.  What should I watch for while using this medicine?  Tell your doctor if your symptoms do not get better or if they get worse. Visit your doctor or health care professional for regular checks on your progress. Because it may take several weeks to see the full effects of this medicine, it is important to continue your treatment as prescribed by your doctor.  Patients and their families should watch out for new or worsening thoughts of suicide or depression. Also watch out for sudden changes in feelings such as feeling anxious, agitated, panicky, irritable, hostile, aggressive,  impulsive, severely restless, overly excited and hyperactive, or not being able to sleep. If this happens, especially at the beginning of treatment or after a change in dose, call your health care professional.  You may get drowsy or dizzy. Do not drive, use machinery, or do anything that needs mental alertness until you know how this medicine affects you. Do not stand or sit up quickly, especially if you are an older patient. This reduces the risk of dizzy or fainting spells. Alcohol may interfere with the effect of this medicine. Avoid alcoholic drinks.  Do not treat yourself for coughs, colds, or allergies without asking your doctor or health care professional for advice. Some ingredients can increase possible side effects.  Your mouth may get dry. Chewing sugarless gum or sucking hard candy, and drinking plenty of water will help. Contact your doctor if the problem does not go away or is severe.  This medicine may cause dry eyes and blurred vision. If you wear contact lenses you may feel some discomfort. Lubricating drops may help. See your eye doctor if the problem does not go away or is severe.  This medicine can cause constipation. Try to have a bowel movement at least every 2 to 3 days. If you do not have a bowel movement for 3 days, call your doctor or health care professional.  This medicine can make you more sensitive to the sun. Keep out of the sun. If you cannot avoid being in the sun, wear protective clothing and use sunscreen. Do not use sun lamps or tanning beds/booths.  What side effects may I notice from receiving this medicine?  Side effects that you should report to your doctor or health care professional as soon as possible:  -allergic reactions like skin rash, itching or hives, swelling of the face, lips, or tongue  -anxious  -breathing problems  -changes in vision  -confusion  -elevated mood, decreased need for sleep, racing thoughts, impulsive behavior  -eye pain  -fast, irregular  heartbeat  -feeling faint or lightheaded, falls  -feeling agitated, angry, or irritable  -fever with increased sweating  -hallucination, loss of contact with reality  -seizures  -stiff muscles  -suicidal thoughts or other mood changes  -tingling, pain, or numbness in the feet or hands  -trouble passing urine or change in the amount of urine  -trouble sleeping  -unusually weak or tired  -vomiting  -yellowing of the eyes or skin  Side effects that usually do not require medical attention (report to your doctor or health care professional if they continue or are bothersome):  -change in sex drive or performance  -change in appetite or weight  -constipation  -dizziness  -dry mouth  -nausea  -tired  -tremors  -upset stomach  This list may not describe all possible side effects. Call your doctor for medical advice about side effects. You may report side effects to FDA at 2-476-FDA-1753.  Where should I keep my medicine?  Keep out of the reach of children.  Store at room temperature between 20 and 25 degrees C (68 and 77 degrees F). Throw away any unused medicine after the expiration date.  NOTE: This sheet is a summary. It may not cover all possible information. If you have questions about this medicine, talk to your doctor, pharmacist, or health care provider.  © 2018 [employer]vier/Gold Standard (2017-05-19 12:14:15)

## 2020-06-11 ENCOUNTER — OFFICE VISIT (OUTPATIENT)
Dept: MEDICAL GROUP | Facility: MEDICAL CENTER | Age: 60
End: 2020-06-11
Attending: NURSE PRACTITIONER
Payer: MEDICAID

## 2020-06-11 VITALS
BODY MASS INDEX: 31.21 KG/M2 | HEIGHT: 72 IN | HEART RATE: 97 BPM | DIASTOLIC BLOOD PRESSURE: 82 MMHG | SYSTOLIC BLOOD PRESSURE: 120 MMHG | OXYGEN SATURATION: 98 % | WEIGHT: 230.4 LBS | TEMPERATURE: 98.7 F

## 2020-06-11 DIAGNOSIS — E55.9 VITAMIN D DEFICIENCY: ICD-10-CM

## 2020-06-11 DIAGNOSIS — I10 ESSENTIAL HYPERTENSION: ICD-10-CM

## 2020-06-11 DIAGNOSIS — G89.29 CHRONIC MIDLINE LOW BACK PAIN WITH RIGHT-SIDED SCIATICA: ICD-10-CM

## 2020-06-11 DIAGNOSIS — F41.9 ANXIETY: ICD-10-CM

## 2020-06-11 DIAGNOSIS — M54.41 CHRONIC MIDLINE LOW BACK PAIN WITH RIGHT-SIDED SCIATICA: ICD-10-CM

## 2020-06-11 DIAGNOSIS — F43.10 POST-TRAUMATIC STRESS DISORDER, UNSPECIFIED: ICD-10-CM

## 2020-06-11 PROCEDURE — 99213 OFFICE O/P EST LOW 20 MIN: CPT | Performed by: NURSE PRACTITIONER

## 2020-06-11 PROCEDURE — 99214 OFFICE O/P EST MOD 30 MIN: CPT | Performed by: NURSE PRACTITIONER

## 2020-06-11 RX ORDER — LISINOPRIL 30 MG/1
30 TABLET ORAL DAILY
Qty: 90 TAB | Refills: 2 | Status: SHIPPED | OUTPATIENT
Start: 2020-03-09 | End: 2020-11-30 | Stop reason: SDUPTHER

## 2020-06-11 RX ORDER — GABAPENTIN 100 MG/1
200 CAPSULE ORAL EVERY MORNING
Qty: 60 CAP | Refills: 1 | Status: SHIPPED | OUTPATIENT
Start: 2020-06-11 | End: 2020-08-25

## 2020-06-11 RX ORDER — ERGOCALCIFEROL 1.25 MG/1
50000 CAPSULE ORAL
Qty: 12 CAP | Refills: 1 | Status: SHIPPED | OUTPATIENT
Start: 2020-06-11 | End: 2020-07-23 | Stop reason: SDUPTHER

## 2020-06-11 RX ORDER — GABAPENTIN 300 MG/1
300 CAPSULE ORAL
Qty: 30 CAP | Refills: 1 | Status: SHIPPED | OUTPATIENT
Start: 2020-06-11 | End: 2020-08-25 | Stop reason: SDUPTHER

## 2020-06-11 ASSESSMENT — ENCOUNTER SYMPTOMS
SHORTNESS OF BREATH: 0
COUGH: 0
FEVER: 0
WHEEZING: 0
CHILLS: 0
DIARRHEA: 0
BACK PAIN: 1
WEIGHT LOSS: 0
CONSTIPATION: 0
BLOOD IN STOOL: 0
PALPITATIONS: 0
NERVOUS/ANXIOUS: 1
ABDOMINAL PAIN: 0

## 2020-06-11 NOTE — ASSESSMENT & PLAN NOTE
Patient reports he finds the gabapentin helpful but would like to decrease the dose slightly due to some somnolence during the day.  He is interested in trying 200 mg of gabapentin in the morning and 300 at bedtime.  He was previously on 300 3 times daily.

## 2020-06-11 NOTE — ASSESSMENT & PLAN NOTE
Patient reports that he has been checking his blood pressure in the community and is consistently getting readings greater than 160/90.   He would like to increase his lisinopril dose. He denies HA and vision changes.

## 2020-06-11 NOTE — PROGRESS NOTES
Chief Complaint   Patient presents with   • Follow-Up   • Medication Refill       Subjective:     HPI:   Sammy Romo is a 60 y.o. male here to discuss the evaluation and management of:        Essential hypertension  Patient reports that he has been checking his blood pressure in the community and is consistently getting readings greater than 160/90.   He would like to increase his lisinopril dose. He denies HA and vision changes.    Anxiety  Ongoing anxiety.  He establish with a psychiatrist, Dr. Hilliard at [facility], he did not feel listened to and does not find the relationship to be therapeutic.  He is interested in establishing with a new psychiatrist.  He does not want to try new medications until he is able to establish with a new psychiatrist.  He is currently struggling with housing and clothing.  He reports he has enough food.  Unfortunately a lot of thought about his son that was murdered are coming back up.  He is anxious about the future and when in enclosed spaces.  He denies HI ad SI.     Chronic midline low back pain with right-sided sciatica  Patient reports he finds the gabapentin helpful but would like to decrease the dose slightly due to some somnolence during the day.  He is interested in trying 200 mg of gabapentin in the morning and 300 at bedtime.  He was previously on 300 3 times daily.    Post-traumatic stress disorder, unspecified  See anxiety discussion.      ROS  Review of Systems   Constitutional: Negative for chills, fever, malaise/fatigue and weight loss.   Respiratory: Negative for cough, shortness of breath and wheezing.    Cardiovascular: Negative for chest pain, palpitations and leg swelling.   Gastrointestinal: Negative for abdominal pain, blood in stool, constipation and diarrhea.   Musculoskeletal: Positive for back pain.   Psychiatric/Behavioral: The patient is nervous/anxious.          No Known Allergies    Current medicines (including changes today)  Current Outpatient Medications    Medication Sig Dispense Refill   • lisinopril (PRINIVIL) 30 MG tablet Take 1 Tab by mouth every day. For high blood pressure 30 Tab 2   • gabapentin (NEURONTIN) 300 MG Cap Take 1 Cap by mouth every bedtime. 30 Cap 1   • gabapentin (NEURONTIN) 100 MG Cap Take 2 Caps by mouth every morning. 60 Cap 1   • ergocalciferol (DRISDOL) 76182 UNIT capsule Take 1 Cap by mouth every 7 days. 12 Cap 1   • cyclobenzaprine (FLEXERIL) 10 MG Tab Take 1 Tab by mouth 3 times a day as needed. 90 Tab 3     No current facility-administered medications for this visit.        Social History     Tobacco Use   • Smoking status: Never Smoker   • Smokeless tobacco: Never Used   Substance Use Topics   • Alcohol use: Yes     Frequency: 2-3 times a week     Drinks per session: 1 or 2     Comment: socially, moderate   • Drug use: No       Patient Active Problem List    Diagnosis Date Noted   • Vitamin D deficiency 06/11/2020   • Post-traumatic stress disorder, unspecified 05/04/2020   • Anxiety 12/05/2019   • Impacted cerumen of right ear 12/05/2019   • Major depressive disorder 11/06/2019   • Encounter to establish care 09/04/2019   • Essential hypertension 09/04/2019   • Chronic midline low back pain with right-sided sciatica 09/04/2019   • Obesity (BMI 30-39.9) 09/04/2019       Family History   Problem Relation Age of Onset   • Hypertension Maternal Grandfather    • Heart Disease Maternal Grandfather           Objective:     /82 (BP Location: Right arm, Patient Position: Sitting, BP Cuff Size: Adult)   Pulse 97   Temp 37.1 °C (98.7 °F) (Temporal)   Ht 1.829 m (6')   Wt 104.5 kg (230 lb 6.4 oz)   SpO2 98%  Body mass index is 31.25 kg/m².    Physical Exam:  Physical Exam   Constitutional: He is oriented to person, place, and time and well-developed, well-nourished, and in no distress. No distress.   HENT:   Head: Normocephalic.   Right Ear: Tympanic membrane and external ear normal.   Left Ear: Tympanic membrane and external ear  normal.   Eyes: Pupils are equal, round, and reactive to light. Conjunctivae and EOM are normal.   Neck: Normal range of motion. Neck supple. No tracheal deviation present.   Cardiovascular: Normal rate, regular rhythm, normal heart sounds and intact distal pulses.   Pulmonary/Chest: Effort normal and breath sounds normal.   Abdominal: Soft. Bowel sounds are normal.   Musculoskeletal: Normal range of motion.   Lymphadenopathy:        Head (right side): No preauricular adenopathy present.        Head (left side): No preauricular adenopathy present.     He has no cervical adenopathy.   Neurological: He is alert and oriented to person, place, and time. He has normal sensation, normal strength and intact cranial nerves. Gait normal.   Skin: Skin is warm and dry.   Psychiatric: Affect and judgment normal.       Assessment and Plan:     The following treatment plan was discussed:    1. Essential hypertension  lisinopril (PRINIVIL) 30 MG tablet  -Chronic problem, unstable.  We will increase his lisinopril dose to 30 mg daily.  He will check his blood pressure in the community when available and report back the numbers.  Potential side effects and discontinuation criteria discussed.   2. Anxiety  REFERRAL TO COMPLEX CARE MANAGEMENT Services Requested: Care Manager to Evaluate and Recommend    REFERRAL TO PSYCHIATRY  -Chronic problem, unstable.  The patient and I have tried multiple medications to help with his anxiety, not have been helpful.  I placed an urgent referral to psychiatry.  Once the referral is excepted my medical assistant will help the patient with schedule an appointment ASAP.  ER precautions discussed.  I have also referred the patient to complex care management as he is struggling with community resources.  He has housing and clothing and secure.  He reports he has no food right now.   3. Post-traumatic stress disorder, unspecified  REFERRAL TO COMPLEX CARE MANAGEMENT Services Requested: Care Manager to  Evaluate and Recommend    REFERRAL TO PSYCHIATRY  -Chronic problem, unstable.  See anxiety discussion above.   4. Chronic midline low back pain with right-sided sciatica  gabapentin (NEURONTIN) 300 MG Cap    gabapentin (NEURONTIN) 100 MG Cap  -Chronic problem, unstable.  His back pain is well controlled however he does feel somnolent during the day.  We will decrease his gabapentin dose to 200 mg in the morning and 300 mg at night per patient request.   5. Vitamin D deficiency  ergocalciferol (DRISDOL) 99358 UNIT capsule  -Chronic problem, stable.  Medication refill.       Any change or worsening of signs or symptoms, patient encouraged to follow-up or report to emergency room for further evaluation. Patient verbalizes understanding and agrees.    Follow-Up: Return in about 4 weeks (around 7/9/2020) for Hypertension and gabapentin efficacy.      PLEASE NOTE: This dictation was created using voice recognition software. I have made every reasonable attempt to correct obvious errors, but I expect that there are errors of grammar and possibly content that I did not discover before finalizing the note.

## 2020-06-11 NOTE — ASSESSMENT & PLAN NOTE
Ongoing anxiety.  He establish with a psychiatrist, Dr. Hilliard at [facility], he did not feel listened to and does not find the relationship to be therapeutic.  He is interested in establishing with a new psychiatrist.  He does not want to try new medications until he is able to establish with a new psychiatrist.  He is currently struggling with housing and clothing.  He reports he has enough food.  Unfortunately a lot of thought about his son that was murdered are coming back up.  He is anxious about the future and when in enclosed spaces.  He denies HI ad SI.

## 2020-06-17 ENCOUNTER — PATIENT OUTREACH (OUTPATIENT)
Dept: HEALTH INFORMATION MANAGEMENT | Facility: OTHER | Age: 60
End: 2020-06-17

## 2020-06-18 SDOH — ECONOMIC STABILITY: FOOD INSECURITY: WITHIN THE PAST 12 MONTHS, THE FOOD YOU BOUGHT JUST DIDN'T LAST AND YOU DIDN'T HAVE MONEY TO GET MORE.: SOMETIMES TRUE

## 2020-06-18 SDOH — ECONOMIC STABILITY: TRANSPORTATION INSECURITY
IN THE PAST 12 MONTHS, HAS LACK OF TRANSPORTATION KEPT YOU FROM MEETINGS, WORK, OR FROM GETTING THINGS NEEDED FOR DAILY LIVING?: NO

## 2020-06-18 SDOH — ECONOMIC STABILITY: FOOD INSECURITY: WITHIN THE PAST 12 MONTHS, YOU WORRIED THAT YOUR FOOD WOULD RUN OUT BEFORE YOU GOT MONEY TO BUY MORE.: SOMETIMES TRUE

## 2020-06-18 SDOH — ECONOMIC STABILITY: TRANSPORTATION INSECURITY
IN THE PAST 12 MONTHS, HAS THE LACK OF TRANSPORTATION KEPT YOU FROM MEDICAL APPOINTMENTS OR FROM GETTING MEDICATIONS?: NO

## 2020-06-18 SDOH — ECONOMIC STABILITY: INCOME INSECURITY: HOW HARD IS IT FOR YOU TO PAY FOR THE VERY BASICS LIKE FOOD, HOUSING, MEDICAL CARE, AND HEATING?: SOMEWHAT HARD

## 2020-06-18 NOTE — PROGRESS NOTES
Community Health Worker Intake  • Social determinates of health intake complete.   • Identified barriers to housing & food insecurity.  • Contact information provided to Sammy Romo.  • Outpatient assessment completed.    6/18 CHW Ramana spoke to pt via telephone and introduced [facility] services. Pt states he needs assistance with housing, clothing and food. Pt mentioned he was using [facility] before but might have to fill out a new darvin for RTC. CHW will call pt 6/19 with resources. If pt needs further assistance, CHW will refer to GREG Lozada.     Plan: CHW will gather all resources for pt and call pt back. CHW will schedule pt for a food rx/food bag  here at [facility] office.

## 2020-06-19 ENCOUNTER — PATIENT OUTREACH (OUTPATIENT)
Dept: HEALTH INFORMATION MANAGEMENT | Facility: OTHER | Age: 60
End: 2020-06-19

## 2020-06-19 NOTE — PROGRESS NOTES
6/19 CHW Ramana spoke to pt via telephone. CHW provided [employer] info for housing and informed pt re: clothing vouchers that [facility] has in resource binder. CHW set up an appt with pt to fill out food rx and to meet MSW Guidos on Mon, 6/22 at 11AM.    Plan: CHW will call pt on Monday morning to remind pt about food rx appt.

## 2020-06-22 ENCOUNTER — PATIENT OUTREACH (OUTPATIENT)
Dept: HEALTH INFORMATION MANAGEMENT | Facility: OTHER | Age: 60
End: 2020-06-22

## 2020-06-22 NOTE — PROGRESS NOTES
6/22 CANDACEW Ramana spoke to pt re: appt today for food rx and meeting with GREG Lozada. Pt asked to reschedule appt for a later time at 3pm and CHW agreed. CHW informed MSW that we will be changing the time.     6/22 CHW Ramana spoke to pt via telephone. CHW checked in with pt to see if he has backpack for food or if pt's friend drove him. Pt states he came to [facility] ear;oer at 1:30PM and met with GREG Lozada. Pt states he will be back tomorrow 6/23 to  food.

## 2020-06-23 ENCOUNTER — PATIENT OUTREACH (OUTPATIENT)
Dept: HEALTH INFORMATION MANAGEMENT | Facility: OTHER | Age: 60
End: 2020-06-23

## 2020-06-23 NOTE — PROGRESS NOTES
6/23 CHEDISON Boles and GREG Lozada met with pt in [facility] lobby. CHW had pt fill out food rx and gave pt food bags and good rx cards. GREG Lozada spoke with pt re: resources that he needs.

## 2020-06-24 ENCOUNTER — PATIENT OUTREACH (OUTPATIENT)
Dept: HEALTH INFORMATION MANAGEMENT | Facility: OTHER | Age: 60
End: 2020-06-24

## 2020-07-01 ENCOUNTER — PATIENT OUTREACH (OUTPATIENT)
Dept: HEALTH INFORMATION MANAGEMENT | Facility: OTHER | Age: 60
End: 2020-07-01

## 2020-07-01 DIAGNOSIS — F41.9 ANXIETY: ICD-10-CM

## 2020-07-01 RX ORDER — ALPRAZOLAM 0.25 MG/1
0.25 TABLET ORAL 2 TIMES DAILY PRN
Qty: 60 TAB | Refills: 0 | Status: SHIPPED
Start: 2020-07-01 | End: 2020-07-23

## 2020-07-01 NOTE — PROGRESS NOTES
7/1 ATILIO Boles spoke to pt via telephone. Pt expressed he is doing fine, but has been trying to reach his PCP, but cannot get in contact. Pt has questions about his meds and also would like to see a psychatrist. CHW will contact PCP. Pt stated he is waiting for a call back from GREG Lozada and CHW consulted with GREG Lozada. CHW will provide pt with a food bag at 9am at [facility] office and GREG Lozada will have resources for pt.    Plan: CHW will call pt in the morning to remind him about food , meet with pt and see if he needs anything else.

## 2020-07-02 ENCOUNTER — PATIENT OUTREACH (OUTPATIENT)
Dept: HEALTH INFORMATION MANAGEMENT | Facility: OTHER | Age: 60
End: 2020-07-02

## 2020-07-02 NOTE — PROGRESS NOTES
7/2 CANDACEW Ramana met with pt w/ MSW Kierra. CHW provided pt with food bags. CHW spoke to pt re: [facility]. CHW called [facility] in the morning and left a voicemail re: if waitlist is open. GREG Lozada assisted pt with clothing resources.     Plan: f/u w/ pt next week to update him re: housing resources.    7/2 CANDACEW Ramana spoke to pt re: [facility] [employer]. Pt will come on Mon, 7/6 to  housing darvin and brochure.

## 2020-07-02 NOTE — PROGRESS NOTES
I spoke to the patient on the phone today regarding his anxiety.  He reports increased symptoms of anxiety, particularly because the anniversary of his son's murder is this weekend.  He is requesting low-dose of Xanax until he is able to establish with psychiatry.  We have previously tried BuSpar SSRIs and hydroxyzine without benefit.  I am going to give him a one-month supply of 0.25 mg Xanax twice daily as needed anxiety.  Potential side effects and discontinuation criteria discussed with patient, patient verbalized understanding.  We discussed risk for dependency, patient verbalized understanding.

## 2020-07-06 ENCOUNTER — PATIENT OUTREACH (OUTPATIENT)
Dept: HEALTH INFORMATION MANAGEMENT | Facility: OTHER | Age: 60
End: 2020-07-06

## 2020-07-07 ENCOUNTER — PATIENT OUTREACH (OUTPATIENT)
Dept: HEALTH INFORMATION MANAGEMENT | Facility: OTHER | Age: 60
End: 2020-07-07

## 2020-07-07 NOTE — PROGRESS NOTES
COMM[facility] MANAGEMENT SOCIAL WORK ASSESSMENT        NARRATIVE:    [facility] MSW spoke with Pt and conducted Chart Review to obtain the information used in this assessment.  Patient is homeless and temporarily staying with a friend. Pt is completely independent in ADLs/IADLs.  Patient described suffering from complicated grief.  Complicated grief may be considered when the intensity of grief has not decreased in the months after a loved one’s death.  I informed Patient that there are many similarities between complicated grief and major depression, but there are also distinct differences.  In some cases, clinical depression and complicated grief occur together.  Getting the correct diagnosis is essential for appropriate treatment, so a comprehensive medical and psychological exam is often done.  Patient stated that he has consulted with Cindy Hernandez about his wish to establish with a new psychiatrist as he would prefer a second opinion and correct dx.  Patient is tentative and ambivalent about continuing with the course of psych medications he is currently taking.  He prefers to establish with a new psychiatrist.  I informed Patient that he is on the right track about being assertive about the course of treatment he is following.  We discussed Coping and Support strategies as Patients son’s murder anniversary is upcoming.  I informed him that although it is important to get treatment for complicated grief, these strategies may help him cope.    He shared that he also suffers from anxiety triggered by memories of his murdered son. Pt is quite insightful about his predicament and stated that if he could only get a higher income and lower housing costs that his quality of life would improve greatly. Patient was given referrals to low income-housing and [place] [facility].  Pt is completely independent in ADLs/IADLs.  Patient denies HI and SI.  Patient struggles with anxiety for which he is being treated at [facility]. Pt is  [facility] with the [facility]. Pt has expressed the following needs:    • Housing  • Clothing   • Food ([facility] and [facility] have given him several good bags)  • Establishing care with new Psychiatrist    Patient is being treated for the following at [facility]:    1.  Hypertension  2.  Anxiety  3.  PTSD, unspecified  4.  Chronic midline low back pain with right-sided sciatica  5.  Vitamin D deficiency.      Pt stated goal(s) for SW involvement:     Pt. seeks to establish new Psychiatrist, food, housing and clothing resources.                    Pt agreeable to the following services and referrals:     [facility] ([facility]  [employer]  Food Bank  [place] State Welfare    Collaterals: TBD    Financial Limitations/difficulties:  Patient subsists on $771 SSD.    Food Insecurities:  Pt has received food bags from [facility] and [facility].   Lack of housing/environmental issues:  Pt would like to find affordable and/or low income housing.      Transportation:  Patient does not own a vehicle; he relies on friends for transportation.  Patient will be referred to [facility] Medicaid services to obtain transportation to medical appointments.       Social Isolation/support/ (NOK):    Patient has a limited support system.  He is currently living with a friend who also provides occasional transportation.      Loss of independence:   Patient does not have ADL/IADL barriers.     Advanced Life Planning:  Patient did not address this topic as he was focused on the conversation about the murder of his son.  I will revisit Advanced Life Planning at an appropriate time.      Assessments completed:  General Care Management      Plan:  Patient added to caseload  Plan of Care Established  Episode opened.        INFORMATION SOURCE:      Patient   ORIENTATION:       x4  WHO IS RESPONSIBLE FOR MAKING DECISIONS FOR PATIENT?              Patient   SDOH:  Primary care provider:      Patient  Lives  with:        Friend  Support Systems:           Limited support system  Housing/Facility:       Friend’s residence   Ability to obtain and take medication as prescribed:     Pt able to obtain medication  Reasons why not taking medications:    N/A  Mobility issues:       None  Prior services (list)      N/A              Durable Medical Equipment:     None  Transportation barriers:      None  Financial barriers:     $771 SSD monthly income  Source of income:       SSD  Prescription coverage:         PSYCOLOGICAL ASSESSMENT  History of substance abuse:     None Disclosed  History of psychiatric issues:    Depression, Anxiety, PTSD, Complicated Grief  DISCHARGE RISKS/BARRIERS:     None  ANTICIPATED DISCHARGE DISPOSITION (home, shelter, tent, etc.)           Friend’s Home   PATIENT GOALS  Goal #1 To obtain affordable Housing      Goal #2 To access MTM      Goal#3   To have a steady Food supply

## 2020-07-07 NOTE — PROGRESS NOTES
7/7 CANDAECW Ramana spoke pt via telephone re: [employer] resources. Pt will come today at 2:30PM to meet with CHW.     7/7 ATILIO Boles met w/ pt at [facility] office. CHW provided pt with [employer] darvin and brochures. Pt states he will fill out the darvin and gather all the requirements that is needed for housing darvin.     Plan: CHW will f/u w/ pt later next week.

## 2020-07-09 ENCOUNTER — PATIENT OUTREACH (OUTPATIENT)
Dept: HEALTH INFORMATION MANAGEMENT | Facility: OTHER | Age: 60
End: 2020-07-09

## 2020-07-13 ENCOUNTER — PATIENT OUTREACH (OUTPATIENT)
Dept: HEALTH INFORMATION MANAGEMENT | Facility: OTHER | Age: 60
End: 2020-07-13

## 2020-07-13 NOTE — PROGRESS NOTES
7/13 CANDACEW Ramana spoke to pt via telephone. Pt will come to [facility] office tomorrow 7/14 to go over [facility] darvin with CHW.     Plan: CHW Tcrosemary will call pt to remind him re: appt for housing tomorrow morning.     7/14 CHW Ramana met with pt re: [facility]. CHW looked over pt's housing darvin and called Senior housing waiting list and [facility] admin. CHW and pt discussed transportation and CHW provided pt with a bus fare. CHW will see if pt is eligible for reduced fare for [place] and consult with PCP.     Plan: CHW will call [facility] again tomorrow and other waiting lists. CHW will meet w/ pt before his PCP appt on 7/16.     7/16 ATILIO Boles met with pt at [facility] office. CHW spoke to [facility] to put him on the wait list to finish the process for [facility]. Admin fr [employer] states she will fax over an application. Pt is doing ok and is excited re: housing.     Plan: CHW will f/u w/ pt re: housing apps.     7/18 ATILIO Ramana received housing darvin from [facility] via e-mail. CHW called pt to see if he is available to come tomorrow 7/23.    7/23 ATILIO Boles met with pt at [facility] office and filled out the housing darvin from [facility]. CHW called Lars at [facility] and mailed the darvin to [employer].     Plan: CHW will call [facility] next week to make sure pt is on the waiting list. CHW will f/u w/ pt next week.

## 2020-07-13 NOTE — LETTER
July 23, 2020        Attn: Office  [address] [place], [address]        To Whom It May Concern:    My name is Nancymaxigeri and I work for [employer]. I am assisting a patient to get housing at [facility] and at [facility]. Can we get a verification sent to Mp at [facility] so my patient Sammy Romo can get housing at [facility]?    If you have any questions, please call me at [phone].             Sincerely,        Jeffy Boles    Electronically Signed

## 2020-07-14 ENCOUNTER — PATIENT OUTREACH (OUTPATIENT)
Dept: HEALTH INFORMATION MANAGEMENT | Facility: OTHER | Age: 60
End: 2020-07-14

## 2020-07-15 ENCOUNTER — PATIENT OUTREACH (OUTPATIENT)
Dept: HEALTH INFORMATION MANAGEMENT | Facility: OTHER | Age: 60
End: 2020-07-15

## 2020-07-23 ENCOUNTER — OFFICE VISIT (OUTPATIENT)
Dept: MEDICAL GROUP | Facility: MEDICAL CENTER | Age: 60
End: 2020-07-23
Attending: NURSE PRACTITIONER
Payer: MEDICAID

## 2020-07-23 VITALS
TEMPERATURE: 98.9 F | WEIGHT: 235.5 LBS | HEART RATE: 76 BPM | DIASTOLIC BLOOD PRESSURE: 84 MMHG | HEIGHT: 73 IN | OXYGEN SATURATION: 96 % | BODY MASS INDEX: 31.21 KG/M2 | SYSTOLIC BLOOD PRESSURE: 124 MMHG

## 2020-07-23 DIAGNOSIS — E55.9 VITAMIN D DEFICIENCY: ICD-10-CM

## 2020-07-23 DIAGNOSIS — I10 ESSENTIAL HYPERTENSION: ICD-10-CM

## 2020-07-23 DIAGNOSIS — Z13.21 ENCOUNTER FOR VITAMIN DEFICIENCY SCREENING: ICD-10-CM

## 2020-07-23 DIAGNOSIS — Z13.6 SCREENING FOR CARDIOVASCULAR CONDITION: ICD-10-CM

## 2020-07-23 DIAGNOSIS — Z11.3 ROUTINE SCREENING FOR STI (SEXUALLY TRANSMITTED INFECTION): ICD-10-CM

## 2020-07-23 DIAGNOSIS — F41.9 ANXIETY: ICD-10-CM

## 2020-07-23 DIAGNOSIS — Z13.228 SCREENING FOR METABOLIC DISORDER: ICD-10-CM

## 2020-07-23 PROCEDURE — 99214 OFFICE O/P EST MOD 30 MIN: CPT | Performed by: NURSE PRACTITIONER

## 2020-07-23 RX ORDER — ALPRAZOLAM 1 MG/1
1 TABLET ORAL 3 TIMES DAILY PRN
Qty: 75 TAB | Refills: 0 | Status: SHIPPED | OUTPATIENT
Start: 2020-07-23 | End: 2020-08-22

## 2020-07-23 RX ORDER — ERGOCALCIFEROL 1.25 MG/1
50000 CAPSULE ORAL
Qty: 4 CAP | Refills: 0 | Status: SHIPPED | OUTPATIENT
Start: 2020-07-23 | End: 2021-03-02

## 2020-07-23 ASSESSMENT — ENCOUNTER SYMPTOMS
PALPITATIONS: 0
FEVER: 0
BLOOD IN STOOL: 0
COUGH: 0
NERVOUS/ANXIOUS: 1
CHILLS: 0
WEIGHT LOSS: 0
CONSTIPATION: 0
WHEEZING: 0
DIARRHEA: 0
ABDOMINAL PAIN: 0
SHORTNESS OF BREATH: 0

## 2020-07-23 NOTE — ASSESSMENT & PLAN NOTE
He reports that he has had some mild improvement with the xanax 0.25mg- but reports that he thinks he needs a higher dose.  He needs the medication daily.  He reports summers off to his most difficult time as it is the anniversary of his son's death.  He reports when he was previously using Xanax he was taking approximately 8 mg daily, so he did not find this 0.25 mg particularly beneficial.  He denies HI and SI.  He is still working on getting with psychiatry, our  is helping him with this.

## 2020-07-23 NOTE — ASSESSMENT & PLAN NOTE
At her last visit we increased his lisinopril dose to 30 mg daily.  He denies side effects from the medication.  BP today was 124/84.  Denies headaches, blurred vision, and chest pain.

## 2020-07-23 NOTE — PROGRESS NOTES
Chief Complaint   Patient presents with   • Follow-Up       Subjective:     HPI:   Sammy Romo is a 60 y.o. male here to discuss the evaluation and management of:        Anxiety  He reports that he has had some mild improvement with the xanax 0.25mg- but reports that he thinks he needs a higher dose.  He needs the medication daily.  He reports summers off to his most difficult time as it is the anniversary of his son's death.  He reports when he was previously using Xanax he was taking approximately 8 mg daily, so he did not find this 0.25 mg particularly beneficial.  He denies HI and SI.  He is still working on getting with psychiatry, our  is helping him with this.    Essential hypertension  At her last visit we increased his lisinopril dose to 30 mg daily.  He denies side effects from the medication.  BP today was 124/84.  Denies headaches, blurred vision, and chest pain.      ROS  Review of Systems   Constitutional: Negative for chills, fever, malaise/fatigue and weight loss.   Respiratory: Negative for cough, shortness of breath and wheezing.    Cardiovascular: Negative for chest pain, palpitations and leg swelling.   Gastrointestinal: Negative for abdominal pain, blood in stool, constipation and diarrhea.   Psychiatric/Behavioral: The patient is nervous/anxious.          No Known Allergies    Current medicines (including changes today)  Current Outpatient Medications   Medication Sig Dispense Refill   • ALPRAZolam (XANAX) 1 MG Tab Take 1 Tab by mouth 3 times a day as needed for Anxiety for up to 30 days. 75 Tab 0   • ergocalciferol (DRISDOL) 40883 UNIT capsule Take 1 Cap by mouth every 7 days. 4 Cap 0   • lisinopril (PRINIVIL) 30 MG tablet Take 1 Tab by mouth every day. For high blood pressure 30 Tab 2   • gabapentin (NEURONTIN) 300 MG Cap Take 1 Cap by mouth every bedtime. 30 Cap 1   • gabapentin (NEURONTIN) 100 MG Cap Take 2 Caps by mouth every morning. 60 Cap 1   • cyclobenzaprine (FLEXERIL)  "10 MG Tab Take 1 Tab by mouth 3 times a day as needed. 90 Tab 3     No current facility-administered medications for this visit.        Social History     Tobacco Use   • Smoking status: Never Smoker   • Smokeless tobacco: Never Used   Substance Use Topics   • Alcohol use: Yes     Frequency: 2-3 times a week     Drinks per session: 1 or 2     Comment: socially, moderate   • Drug use: No       Patient Active Problem List    Diagnosis Date Noted   • Vitamin D deficiency 06/11/2020   • Post-traumatic stress disorder, unspecified 05/04/2020   • Anxiety 12/05/2019   • Impacted cerumen of right ear 12/05/2019   • Major depressive disorder 11/06/2019   • Encounter to establish care 09/04/2019   • Essential hypertension 09/04/2019   • Chronic midline low back pain with right-sided sciatica 09/04/2019   • Obesity (BMI 30-39.9) 09/04/2019       Family History   Problem Relation Age of Onset   • Hypertension Maternal Grandfather    • Heart Disease Maternal Grandfather           Objective:     /84 (BP Location: Right arm, Patient Position: Sitting, BP Cuff Size: Adult)   Pulse 76   Temp 37.2 °C (98.9 °F) (Temporal)   Ht 1.854 m (6' 1\")   Wt 106.8 kg (235 lb 8 oz)   SpO2 96%  Body mass index is 31.07 kg/m².    Physical Exam:  Physical Exam   Constitutional: He is oriented to person, place, and time and well-developed, well-nourished, and in no distress. No distress.   HENT:   Head: Normocephalic.   Right Ear: Tympanic membrane and external ear normal.   Left Ear: Tympanic membrane and external ear normal.   Eyes: Pupils are equal, round, and reactive to light. Conjunctivae and EOM are normal.   Neck: Normal range of motion. Neck supple. No tracheal deviation present.   Cardiovascular: Normal rate, regular rhythm, normal heart sounds and intact distal pulses.   Pulmonary/Chest: Effort normal and breath sounds normal.   Abdominal: Soft. Bowel sounds are normal.   Musculoskeletal: Normal range of motion. "   Lymphadenopathy:        Head (right side): No preauricular adenopathy present.        Head (left side): No preauricular adenopathy present.     He has no cervical adenopathy.   Neurological: He is alert and oriented to person, place, and time. He has normal sensation, normal strength and intact cranial nerves. Gait normal.   Skin: Skin is warm and dry.   Psychiatric: Affect and judgment normal.       Assessment and Plan:     The following treatment plan was discussed:    1. Anxiety  ALPRAZolam (XANAX) 1 MG Tab  -Chronic problem, unstable.  We will increase his Xanax dose to 1 mg.  I am providing 75 total Xanax for the month.  This will provide him half the days with twice daily and half the days with 3 times daily as needed options.  If he is finding he is consistently needing 1 mg 3 times daily, we will increase his dose at our next visit.  I told the patient the highest dose of Xanax I would be comfortable with his 1 mg 3 times daily, patient is agreeable.  Continue to work on getting established with psychiatry.   2. Essential hypertension  MICROALBUMIN CREAT RATIO URINE  -Chronic problem, stable.  We will check his ACR.  Continue lisinopril 30 mg daily.   3. Vitamin D deficiency  ergocalciferol (DRISDOL) 75423 UNIT capsule  -Chronic problem, unclear stability.  We will recheck his level as it has not been checked since September 2019.   4. Screening for metabolic disorder  TSH WITH REFLEX TO FT4    HEMOGLOBIN A1C   5. Screening for cardiovascular condition  Lipid Profile   6. Encounter for vitamin deficiency screening  VITAMIN D,25 HYDROXY   7. Routine screening for STI (sexually transmitted infection)  HIV AG/AB COMBO ASSAY SCREENING    T.PALLIDUM AB EIA    Chlamydia/GC PCR Urine Or Swab       Any change or worsening of signs or symptoms, patient encouraged to follow-up or report to emergency room for further evaluation. Patient verbalizes understanding and agrees.    Follow-Up: Return in about 4 weeks  (around 8/20/2020) for Controlled substance management- anxiety.      PLEASE NOTE: This dictation was created using voice recognition software. I have made every reasonable attempt to correct obvious errors, but I expect that there are errors of grammar and possibly content that I did not discover before finalizing the note.

## 2020-07-24 NOTE — PROGRESS NOTES
D/C Pt as he has completed all goals with [facility].  He stated that he would like to complete housing application process with W Jeffy Boles.

## 2020-07-30 ENCOUNTER — PATIENT OUTREACH (OUTPATIENT)
Dept: HEALTH INFORMATION MANAGEMENT | Facility: OTHER | Age: 60
End: 2020-07-30

## 2020-08-06 NOTE — PROGRESS NOTES
8/6 CHW recevied a call from Sarah at [facility] that pt is now on waiting list. CHW scheduled pt to come to [facility] to fill out a new [facility] darvin for tomorrow 8/7 at 9:30.     8/7 CHW Ramana met w/ pt at [facility] office. CHW and pt filled out [facility] on [address] darvin together. Pt will come back to [facility] office next Monday, 8/10 to bring ID/SS and SSI Award letter at 2pm.    Plan: CHW will call pt on Monday morning to remind him about the appt and make sure darvin is filled out properly.

## 2020-08-07 DIAGNOSIS — M54.41 CHRONIC MIDLINE LOW BACK PAIN WITH RIGHT-SIDED SCIATICA: ICD-10-CM

## 2020-08-07 DIAGNOSIS — G89.29 CHRONIC MIDLINE LOW BACK PAIN WITH RIGHT-SIDED SCIATICA: ICD-10-CM

## 2020-08-07 PROCEDURE — RXMED WILLOW AMBULATORY MEDICATION CHARGE: Performed by: NURSE PRACTITIONER

## 2020-08-10 DIAGNOSIS — M54.41 CHRONIC MIDLINE LOW BACK PAIN WITH RIGHT-SIDED SCIATICA: ICD-10-CM

## 2020-08-10 DIAGNOSIS — G89.29 CHRONIC MIDLINE LOW BACK PAIN WITH RIGHT-SIDED SCIATICA: ICD-10-CM

## 2020-08-10 PROCEDURE — RXMED WILLOW AMBULATORY MEDICATION CHARGE: Performed by: NURSE PRACTITIONER

## 2020-08-10 RX ORDER — CYCLOBENZAPRINE HCL 10 MG
10 TABLET ORAL 3 TIMES DAILY PRN
Qty: 90 TAB | Refills: 3 | Status: SHIPPED | OUTPATIENT
Start: 2020-08-10 | End: 2022-07-07 | Stop reason: SDUPTHER

## 2020-08-10 RX ORDER — CYCLOBENZAPRINE HCL 10 MG
10 TABLET ORAL 3 TIMES DAILY PRN
Qty: 90 TAB | Refills: 3 | Status: SHIPPED
Start: 2020-08-10 | End: 2020-08-10 | Stop reason: SDUPTHER

## 2020-08-11 ENCOUNTER — PHARMACY VISIT (OUTPATIENT)
Dept: PHARMACY | Facility: MEDICAL CENTER | Age: 60
End: 2020-08-11
Payer: COMMERCIAL

## 2020-08-11 ENCOUNTER — PATIENT OUTREACH (OUTPATIENT)
Dept: HEALTH INFORMATION MANAGEMENT | Facility: OTHER | Age: 60
End: 2020-08-11

## 2020-08-25 ENCOUNTER — OFFICE VISIT (OUTPATIENT)
Dept: MEDICAL GROUP | Facility: MEDICAL CENTER | Age: 60
End: 2020-08-25
Attending: FAMILY MEDICINE
Payer: MEDICAID

## 2020-08-25 ENCOUNTER — PHARMACY VISIT (OUTPATIENT)
Dept: PHARMACY | Facility: MEDICAL CENTER | Age: 60
End: 2020-08-25
Payer: COMMERCIAL

## 2020-08-25 VITALS
HEIGHT: 72 IN | WEIGHT: 235 LBS | SYSTOLIC BLOOD PRESSURE: 132 MMHG | TEMPERATURE: 98.4 F | HEART RATE: 88 BPM | DIASTOLIC BLOOD PRESSURE: 80 MMHG | BODY MASS INDEX: 31.83 KG/M2 | OXYGEN SATURATION: 100 %

## 2020-08-25 DIAGNOSIS — F41.9 ANXIETY DISORDER: ICD-10-CM

## 2020-08-25 DIAGNOSIS — M54.41 CHRONIC MIDLINE LOW BACK PAIN WITH RIGHT-SIDED SCIATICA: ICD-10-CM

## 2020-08-25 DIAGNOSIS — F41.9 ANXIETY: ICD-10-CM

## 2020-08-25 DIAGNOSIS — G89.29 CHRONIC MIDLINE LOW BACK PAIN WITH RIGHT-SIDED SCIATICA: ICD-10-CM

## 2020-08-25 PROCEDURE — RXMED WILLOW AMBULATORY MEDICATION CHARGE: Performed by: FAMILY MEDICINE

## 2020-08-25 PROCEDURE — 99213 OFFICE O/P EST LOW 20 MIN: CPT | Performed by: FAMILY MEDICINE

## 2020-08-25 RX ORDER — ALPRAZOLAM 1 MG/1
TABLET ORAL
Qty: 21 TAB | Refills: 0 | Status: SHIPPED | OUTPATIENT
Start: 2020-08-25 | End: 2020-08-25 | Stop reason: SDUPTHER

## 2020-08-25 RX ORDER — GABAPENTIN 300 MG/1
300 CAPSULE ORAL
Qty: 30 CAP | Refills: 1 | Status: SHIPPED | OUTPATIENT
Start: 2020-08-25 | End: 2020-08-25 | Stop reason: SDUPTHER

## 2020-08-25 RX ORDER — GABAPENTIN 300 MG/1
300 CAPSULE ORAL
Qty: 30 CAP | Refills: 1 | Status: CANCELLED | OUTPATIENT
Start: 2020-08-25

## 2020-08-25 RX ORDER — GABAPENTIN 300 MG/1
300 CAPSULE ORAL
Qty: 30 CAP | Refills: 0 | Status: SHIPPED | OUTPATIENT
Start: 2020-08-25 | End: 2020-10-01 | Stop reason: SDUPTHER

## 2020-08-25 RX ORDER — ALPRAZOLAM 1 MG/1
TABLET ORAL
Qty: 75 TAB | Refills: 0 | Status: SHIPPED | OUTPATIENT
Start: 2020-07-23 | End: 2020-08-25 | Stop reason: SDUPTHER

## 2020-08-25 RX ORDER — ALPRAZOLAM 1 MG/1
TABLET ORAL
Qty: 75 TAB | Refills: 0 | Status: CANCELLED | OUTPATIENT
Start: 2020-08-25 | End: 2021-02-23

## 2020-08-25 RX ORDER — ALPRAZOLAM 1 MG/1
TABLET ORAL
Qty: 21 TAB | Refills: 0 | Status: SHIPPED | OUTPATIENT
Start: 2020-08-25 | End: 2020-09-02

## 2020-08-25 RX ORDER — ALPRAZOLAM 1 MG/1
1 TABLET ORAL 3 TIMES DAILY PRN
Qty: 75 TAB | Refills: 0 | OUTPATIENT
Start: 2020-08-25 | End: 2020-09-24

## 2020-08-25 NOTE — PROGRESS NOTES
Chief Complaint:   Chief Complaint   Patient presents with   • Medication Refill       HPI:  Sammy Romo is a 60 y.o. fe/male who presents for ***    Chief Complaint:   Chief Complaint   Patient presents with   • Medication Refill       HPI:  Sammy Romo is a 60 y.o. fe/male who presents for ***    No problem-specific Assessment & Plan notes found for this encounter.          Past medical history, family history, social history and medications reviewed and updated in the record.   Current medications, problem list and allergies reviewed in [facility]  Health maintenance topics are reviewed and updated.    Patient Active Problem List    Diagnosis Date Noted   • Vitamin D deficiency 06/11/2020   • Post-traumatic stress disorder, unspecified 05/04/2020   • Anxiety 12/05/2019   • Impacted cerumen of right ear 12/05/2019   • Major depressive disorder 11/06/2019   • Encounter to establish care 09/04/2019   • Essential hypertension 09/04/2019   • Chronic midline low back pain with right-sided sciatica 09/04/2019   • Obesity (BMI 30-39.9) 09/04/2019     Family History   Problem Relation Age of Onset   • Hypertension Maternal Grandfather    • Heart Disease Maternal Grandfather      Social History     Socioeconomic History   • Marital status: Single     Spouse name: Not on file   • Number of children: Not on file   • Years of education: Not on file   • Highest education level: Not on file   Occupational History   • Not on file   Social Needs   • Financial resource strain: Somewhat hard   • Food insecurity     Worry: Sometimes true     Inability: Sometimes true   • Transportation needs     Medical: No     Non-medical: No   Tobacco Use   • Smoking status: Never Smoker   • Smokeless tobacco: Never Used   Substance and Sexual Activity   • Alcohol use: Yes     Frequency: 2-3 times a week     Drinks per session: 1 or 2     Comment: socially, moderate   • Drug use: No   • Sexual activity: Not Currently   Lifestyle   • Physical activity      Days per week: Not on file     Minutes per session: Not on file   • Stress: Not on file   Relationships   • Social connections     Talks on phone: Not on file     Gets together: Not on file     Attends Buddhist service: Not on file     Active member of club or organization: Not on file     Attends meetings of clubs or organizations: Not on file     Relationship status: Not on file   • Intimate partner violence     Fear of current or ex partner: Not on file     Emotionally abused: Not on file     Physically abused: Not on file     Forced sexual activity: Not on file   Other Topics Concern   • Not on file   Social History Narrative   • Not on file           Review Of Systems  As documented in HPI above  PHYSICAL EXAMINATION:  [unfilled]    ASSESSMENT/Plan:  1. Anxiety disorder  ALPRAZolam (XANAX) 1 MG Tab    DISCONTINUED: ALPRAZolam (XANAX) 1 MG Tab    DISCONTINUED: ALPRAZolam (XANAX) 1 MG Tab               Past medical history, family history, social history and medications reviewed and updated in the record.   Current medications, problem list and allergies reviewed in [facility]  Health maintenance topics are reviewed and updated.    Patient Active Problem List    Diagnosis Date Noted   • Vitamin D deficiency 06/11/2020   • Post-traumatic stress disorder, unspecified 05/04/2020   • Anxiety 12/05/2019   • Impacted cerumen of right ear 12/05/2019   • Major depressive disorder 11/06/2019   • Encounter to [facility] care 09/04/2019   • Essential hypertension 09/04/2019   • Chronic midline low back pain with right-sided sciatica 09/04/2019   • Obesity (BMI 30-39.9) 09/04/2019     Family History   Problem Relation Age of Onset   • Hypertension Maternal Grandfather    • Heart Disease Maternal Grandfather      Social History     Socioeconomic History   • Marital status: Single     Spouse name: Not on file   • Number of children: Not on file   • Years of education: Not on file   • Highest education level: Not on file    Occupational History   • Not on file   Social Needs   • Financial resource strain: Somewhat hard   • Food insecurity     Worry: Sometimes true     Inability: Sometimes true   • Transportation needs     Medical: No     Non-medical: No   Tobacco Use   • Smoking status: Never Smoker   • Smokeless tobacco: Never Used   Substance and Sexual Activity   • Alcohol use: Yes     Frequency: 2-3 times a week     Drinks per session: 1 or 2     Comment: socially, moderate   • Drug use: No   • Sexual activity: Not Currently   Lifestyle   • Physical activity     Days per week: Not on file     Minutes per session: Not on file   • Stress: Not on file   Relationships   • Social connections     Talks on phone: Not on file     Gets together: Not on file     Attends Voodoo service: Not on file     Active member of club or organization: Not on file     Attends meetings of clubs or organizations: Not on file     Relationship status: Not on file   • Intimate partner violence     Fear of current or ex partner: Not on file     Emotionally abused: Not on file     Physically abused: Not on file     Forced sexual activity: Not on file   Other Topics Concern   • Not on file   Social History Narrative   • Not on file     Current Outpatient Medications   Medication Sig Dispense Refill   • ALPRAZolam (XANAX) 1 MG Tab TAKE 1 TABLET ORALLY 3 TIMES DAILY AS NEEDED FOR ANXIETY FOR UP TO 30 DAYS (F41.9) 21 Tab 0   • gabapentin (NEURONTIN) 300 MG Cap Take 1 Cap by mouth every bedtime. 30 Cap 0   • cyclobenzaprine (FLEXERIL) 10 MG Tab Take 1 Tab by mouth 3 times a day as needed. 90 Tab 3   • lisinopril (PRINIVIL) 30 MG tablet TAKE 1 TABLET ORALLY ONCE DAILY FOR HIGH BLOOD PRESSURE 30 Tab 2   • ergocalciferol (DRISDOL) 29885 UNIT capsule Take 1 Cap by mouth every 7 days. 4 Cap 0   • lisinopril (PRINIVIL) 30 MG tablet Take 1 Tab by mouth every day. For high blood pressure 90 Tab 2     No current facility-administered medications for this visit.             Review Of Systems  As documented in HPI above  PHYSICAL EXAMINATION:    /80 (BP Location: Right arm, Patient Position: Sitting, BP Cuff Size: Adult)   Pulse 88   Temp 36.9 °C (98.4 °F) (Temporal)   Ht 1.829 m (6')   Wt 106.6 kg (235 lb)   SpO2 100%   BMI 31.87 kg/m²   Gen.: Well-developed, well-nourished, no apparent distress, pleasant and cooperative with the examination  HEENT: Normocephalic/atraumatic, sinuses nontender with palpation, TMs clear, nares patent with pink mucosa and clear rhinorrhea, oropharynx clear  Neck: No JVD or bruits, no adenopathy  Cor: Regular rate and rhythm without murmur gallop or rub  Lungs: Clear to auscultation with equal breath sounds bilaterally. No wheezes, rhonchi.  Abdomen: Soft nontender without hepatosplenomegaly or masses appreciated, normoactive bowel sounds  Extremities: No cyanosis, clubbing or edema          ASSESSMENT/Plan:  1. Anxiety disorder  ALPRAZolam (XANAX) 1 MG Tab    DISCONTINUED: ALPRAZolam (XANAX) 1 MG Tab    DISCONTINUED: ALPRAZolam (XANAX) 1 MG Tab       Please note that this dictation was created using voice recognition software. I have made every reasonable attempt to correct obvious errors but there may be errors of grammar and content that I may have overlooked prior to finalization of this note.

## 2020-08-25 NOTE — TELEPHONE ENCOUNTER
Received request via: Patient    Was the patient seen in the last year in this department? Yes    Does the patient have an active prescription (recently filled or refills available) for medication(s) requested? Yes. Refill request has been refused in Epic. Contacted pharmacy and called in most recent prescription.

## 2020-08-26 LAB
CHOLEST SERPL-MCNC: 240 MG/DL (ref 100–199)
HDLC SERPL-MCNC: 48 MG/DL
LABORATORY COMMENT REPORT: ABNORMAL
LDLC SERPL CALC-MCNC: 153 MG/DL (ref 0–99)
TRIGL SERPL-MCNC: 194 MG/DL (ref 0–149)
TSH SERPL DL<=0.005 MIU/L-ACNC: 4.26 UIU/ML (ref 0.45–4.5)
VLDLC SERPL CALC-MCNC: 39 MG/DL (ref 5–40)

## 2020-08-27 NOTE — PROGRESS NOTES
Chief Complaint:   Chief Complaint   Patient presents with   • Medication Refill       HPI: Established patient, PCP is out of office  Sammy Romo is a 60 y.o. male who presents for refill of his anxiety medications     Anxiety disorder    Patient is here to refill his alprazolam that he has been taking for control of anxiety symptoms, patient said he has been taking the medication 3 times a day over 1 mg of alprazolam as per directions from his PCP.  Not taking any type of SSRI or antidepressant at this time because he said they did not work for him.  Requesting refill of his medication today denies side effects        Past medical history, family history, social history and medications reviewed and updated in the record.  Today  Current medications, problem list and allergies reviewed in [facility] today  Health maintenance topics are reviewed and updated.    Patient Active Problem List    Diagnosis Date Noted   • Vitamin D deficiency 06/11/2020   • Post-traumatic stress disorder, unspecified 05/04/2020   • Anxiety 12/05/2019   • Impacted cerumen of right ear 12/05/2019   • Major depressive disorder 11/06/2019   • Encounter to establish care 09/04/2019   • Essential hypertension 09/04/2019   • Chronic midline low back pain with right-sided sciatica 09/04/2019   • Obesity (BMI 30-39.9) 09/04/2019     Family History   Problem Relation Age of Onset   • Hypertension Maternal Grandfather    • Heart Disease Maternal Grandfather      Social History     Socioeconomic History   • Marital status: Single     Spouse name: Not on file   • Number of children: Not on file   • Years of education: Not on file   • Highest education level: Not on file   Occupational History   • Not on file   Social Needs   • Financial resource strain: Somewhat hard   • Food insecurity     Worry: Sometimes true     Inability: Sometimes true   • Transportation needs     Medical: No     Non-medical: No   Tobacco Use   • Smoking status: Never Smoker   •  Smokeless tobacco: Never Used   Substance and Sexual Activity   • Alcohol use: Yes     Frequency: 2-3 times a week     Drinks per session: 1 or 2     Comment: socially, moderate   • Drug use: No   • Sexual activity: Not Currently   Lifestyle   • Physical activity     Days per week: Not on file     Minutes per session: Not on file   • Stress: Not on file   Relationships   • Social connections     Talks on phone: Not on file     Gets together: Not on file     Attends Mu-ism service: Not on file     Active member of club or organization: Not on file     Attends meetings of clubs or organizations: Not on file     Relationship status: Not on file   • Intimate partner violence     Fear of current or ex partner: Not on file     Emotionally abused: Not on file     Physically abused: Not on file     Forced sexual activity: Not on file   Other Topics Concern   • Not on file   Social History Narrative   • Not on file     Current Outpatient Medications   Medication Sig Dispense Refill   • ALPRAZolam (XANAX) 1 MG Tab TAKE 1 TABLET ORALLY 3 TIMES DAILY AS NEEDED FOR ANXIETY FOR UP TO 30 DAYS (F41.9) 21 Tab 0   • gabapentin (NEURONTIN) 300 MG Cap Take 1 Cap by mouth every bedtime. 30 Cap 0   • cyclobenzaprine (FLEXERIL) 10 MG Tab Take 1 Tab by mouth 3 times a day as needed. 90 Tab 3   • lisinopril (PRINIVIL) 30 MG tablet TAKE 1 TABLET ORALLY ONCE DAILY FOR HIGH BLOOD PRESSURE 30 Tab 2   • ergocalciferol (DRISDOL) 88539 UNIT capsule Take 1 Cap by mouth every 7 days. 4 Cap 0   • lisinopril (PRINIVIL) 30 MG tablet Take 1 Tab by mouth every day. For high blood pressure 90 Tab 2     No current facility-administered medications for this visit.            Review Of Systems  As documented in HPI above  PHYSICAL EXAMINATION:    /80 (BP Location: Right arm, Patient Position: Sitting, BP Cuff Size: Adult)   Pulse 88   Temp 36.9 °C (98.4 °F) (Temporal)   Ht 1.829 m (6')   Wt 106.6 kg (235 lb)   SpO2 100%   BMI 31.87 kg/m²    Gen.: Well-developed, well-nourished, no apparent distress, pleasant and cooperative with the examination  HEENT: Normocephalic/atraumatic,   Neck: No JVD or bruits, no adenopathy  Cor: Regular rate and rhythm without murmur gallop or rub  Lungs: Clear to auscultation with equal breath sounds bilaterally. No wheezes, rhonchi.  Abdomen: Soft nontender without hepatosplenomegaly or masses appreciated, normoactive bowel sounds  Extremities: No cyanosis, clubbing or edema          ASSESSMENT/Plan:  1. Anxiety disorder   chronic problem, not well controlled.  Patient requesting anxiety medications.  Counseled and discussed with the patient importance of starting long-term medication to better control his anxiety symptoms and to taper down slowly on the alprazolam       to consider psychiatry follow-up.  Refilled 1 week supply of his medication until he is able to see his PCP for further evaluation alprazolam.    ALPRAZolam (XANAX) 1 MG Tab    DISCONTINUED: ALPRAZolam (XANAX) 1 MG Tab    DISCONTINUED: ALPRAZolam (XANAX) 1 MG Tab       Please note that this dictation was created using voice recognition software. I have made every reasonable attempt to correct obvious errors but there may be errors of grammar and content that I may have overlooked prior to finalization of this note.

## 2020-09-02 ENCOUNTER — OFFICE VISIT (OUTPATIENT)
Dept: MEDICAL GROUP | Facility: MEDICAL CENTER | Age: 60
End: 2020-09-02
Attending: NURSE PRACTITIONER
Payer: MEDICAID

## 2020-09-02 ENCOUNTER — PHARMACY VISIT (OUTPATIENT)
Dept: PHARMACY | Facility: MEDICAL CENTER | Age: 60
End: 2020-09-02
Payer: COMMERCIAL

## 2020-09-02 VITALS
HEIGHT: 72 IN | WEIGHT: 231.3 LBS | SYSTOLIC BLOOD PRESSURE: 118 MMHG | DIASTOLIC BLOOD PRESSURE: 78 MMHG | TEMPERATURE: 97.8 F | OXYGEN SATURATION: 97 % | HEART RATE: 78 BPM | BODY MASS INDEX: 31.33 KG/M2

## 2020-09-02 DIAGNOSIS — I10 ESSENTIAL HYPERTENSION: ICD-10-CM

## 2020-09-02 DIAGNOSIS — Z23 NEED FOR VACCINATION: ICD-10-CM

## 2020-09-02 DIAGNOSIS — F41.9 ANXIETY: ICD-10-CM

## 2020-09-02 PROCEDURE — RXMED WILLOW AMBULATORY MEDICATION CHARGE: Performed by: NURSE PRACTITIONER

## 2020-09-02 PROCEDURE — 99214 OFFICE O/P EST MOD 30 MIN: CPT | Performed by: NURSE PRACTITIONER

## 2020-09-02 PROCEDURE — 90715 TDAP VACCINE 7 YRS/> IM: CPT

## 2020-09-02 PROCEDURE — 99213 OFFICE O/P EST LOW 20 MIN: CPT | Performed by: NURSE PRACTITIONER

## 2020-09-02 RX ORDER — ALPRAZOLAM 0.5 MG/1
0.5 TABLET ORAL 3 TIMES DAILY PRN
Qty: 90 TAB | Refills: 0 | Status: SHIPPED | OUTPATIENT
Start: 2020-09-02 | End: 2020-10-01 | Stop reason: SDUPTHER

## 2020-09-02 ASSESSMENT — ENCOUNTER SYMPTOMS
CONSTIPATION: 0
CHILLS: 0
DIARRHEA: 0
BLOOD IN STOOL: 0
WHEEZING: 0
SHORTNESS OF BREATH: 0
COUGH: 0
WEIGHT LOSS: 0
FEVER: 0
ABDOMINAL PAIN: 0
PALPITATIONS: 0

## 2020-09-02 NOTE — PATIENT INSTRUCTIONS
DR JOHN POTTS  [address]  [facility] [address]  [phone] FAX

## 2020-09-02 NOTE — PROGRESS NOTES
Chief Complaint   Patient presents with   • Hypertension       Subjective:     HPI:   Sammy Romo is a 60 y.o. male here to discuss the evaluation and management of:        Essential hypertension  Improved, he reports he is tolerating the increased dose of lisinopril 30 mg daily.  He denies CP, HA, and SOB.      Anxiety  Improving, he is tolerating the xanax 1 mg TID well.  He has not been able to establish with psychiatry yet as he has not gotten a call.  He denies SE.  He denies HI and SI.  His mood is improving as time passes away from the anniversary of his son's death.        ROS  Review of Systems   Constitutional: Negative for chills, fever, malaise/fatigue and weight loss.   Respiratory: Negative for cough, shortness of breath and wheezing.    Cardiovascular: Negative for chest pain, palpitations and leg swelling.   Gastrointestinal: Negative for abdominal pain, blood in stool, constipation and diarrhea.   Psychiatric/Behavioral: Negative for suicidal ideas.         No Known Allergies    Current medicines (including changes today)  Current Outpatient Medications   Medication Sig Dispense Refill   • ALPRAZolam (XANAX) 0.5 MG Tab Take 1 Tab by mouth 3 times a day as needed for Anxiety for up to 30 days. 90 Tab 0   • gabapentin (NEURONTIN) 300 MG Cap Take 1 Cap by mouth every bedtime. 30 Cap 0   • cyclobenzaprine (FLEXERIL) 10 MG Tab Take 1 Tab by mouth 3 times a day as needed. 90 Tab 3   • ergocalciferol (DRISDOL) 10593 UNIT capsule Take 1 Cap by mouth every 7 days. 4 Cap 0   • lisinopril (PRINIVIL) 30 MG tablet Take 1 Tab by mouth every day. For high blood pressure 90 Tab 2     No current facility-administered medications for this visit.        Social History     Tobacco Use   • Smoking status: Never Smoker   • Smokeless tobacco: Never Used   Substance Use Topics   • Alcohol use: Yes     Frequency: 2-3 times a week     Drinks per session: 1 or 2     Comment: socially, moderate   • Drug use: No       Patient  Active Problem List    Diagnosis Date Noted   • Vitamin D deficiency 06/11/2020   • Post-traumatic stress disorder, unspecified 05/04/2020   • Anxiety 12/05/2019   • Impacted cerumen of right ear 12/05/2019   • Major depressive disorder 11/06/2019   • Encounter to establish care 09/04/2019   • Essential hypertension 09/04/2019   • Chronic midline low back pain with right-sided sciatica 09/04/2019   • Obesity (BMI 30-39.9) 09/04/2019       Family History   Problem Relation Age of Onset   • Hypertension Maternal Grandfather    • Heart Disease Maternal Grandfather           Objective:     /78 (BP Location: Left arm, Patient Position: Sitting, BP Cuff Size: Adult)   Pulse 78   Temp 36.6 °C (97.8 °F) (Temporal)   Ht 1.829 m (6')   Wt 104.9 kg (231 lb 4.8 oz)   SpO2 97%  Body mass index is 31.37 kg/m².    Physical Exam:  Physical Exam   Constitutional: He is oriented to person, place, and time and well-developed, well-nourished, and in no distress. No distress.   HENT:   Head: Normocephalic.   Right Ear: Tympanic membrane and external ear normal.   Left Ear: Tympanic membrane and external ear normal.   Eyes: Pupils are equal, round, and reactive to light. Conjunctivae and EOM are normal.   Neck: Normal range of motion. Neck supple. No tracheal deviation present.   Cardiovascular: Normal rate, regular rhythm, normal heart sounds and intact distal pulses.   Pulmonary/Chest: Effort normal and breath sounds normal.   Abdominal: Soft. Bowel sounds are normal.   Musculoskeletal: Normal range of motion.   Lymphadenopathy:        Head (right side): No preauricular adenopathy present.        Head (left side): No preauricular adenopathy present.     He has no cervical adenopathy.   Neurological: He is alert and oriented to person, place, and time. He has normal sensation, normal strength and intact cranial nerves. Gait normal.   Skin: Skin is warm and dry.   Psychiatric: Affect and judgment normal.     I have placed the  below orders and discussed them with an approved delegating provider.  The MA is performing the below orders under the direction of Dr. Tompkins.    Assessment and Plan:     The following treatment plan was discussed:    1. Anxiety  ALPRAZolam (XANAX) 0.5 MG Tab  - Chronic problem, improving.  We are decreasing his xanax dose to a safer level of 0.5mg TID prn.  We discussed the dangers and sx of benzo withdrawal, pt verbalized understanding.  We are working on getting him an appointment with psychiatry.  ER precautions reviewed.   2. Essential hypertension   chronic problem, stable.  We will continue lisinopril 30 mg daily.   3. Need for vaccination  Tdap =>6yo IM       Any change or worsening of signs or symptoms, patient encouraged to follow-up or report to emergency room for further evaluation. Patient verbalizes understanding and agrees.    Follow-Up: No follow-ups on file.      PLEASE NOTE: This dictation was created using voice recognition software. I have made every reasonable attempt to correct obvious errors, but I expect that there are errors of grammar and possibly content that I did not discover before finalizing the note.

## 2020-09-03 NOTE — ASSESSMENT & PLAN NOTE
Improving, he is tolerating the xanax 1 mg TID well.  He has not been able to establish with psychiatry yet as he has not gotten a call.  He denies SE.  He denies HI and SI.  His mood is improving as time passes away from the anniversary of his son's death.

## 2020-09-03 NOTE — ASSESSMENT & PLAN NOTE
Improved, he reports he is tolerating the increased dose of lisinopril 30 mg daily.  He denies CP, HA, and SOB.

## 2020-09-10 ENCOUNTER — PATIENT OUTREACH (OUTPATIENT)
Dept: HEALTH INFORMATION MANAGEMENT | Facility: OTHER | Age: 60
End: 2020-09-10

## 2020-09-10 NOTE — PROGRESS NOTES
9/9: CHW Jeffery reached out to pt to introduce herself and let him know this CHW will be working with him from now on to assist with housing, etc. No answer, left VM.    CHW also reached out to Teresa,  at the [facility] who is assisting with process of pt's application to the [facility]. Pt. needs to complete new application and include updated ID. Teresa will email blank application and requirements to ATILIO Gil.         Plan: CANDACEW Jeffery will connect with pt. and have him complete application and bring in updated ID.    9/16: [facility] CHW Ramana, who was previously working with pt. CCM Manager Vivien met with pt to see what he needed assistance with. Per Vivien, pt indicated he was still needing assistance applying for housing at the [facility]. Pt gave Vivien updated phone number for this CHW to reach out to pt.     9/18: CHW Stephanie reached out to pt via TC regarding diabetes program but person answered and indicated that number did not belong to Sammy Romo. CHW Jeffery also reached out and received this response.     10/5: CHW has not heard back from this pt and has not been able to contact pt. Will d/c due to lack of contact.

## 2020-09-15 ENCOUNTER — PHARMACY VISIT (OUTPATIENT)
Dept: PHARMACY | Facility: MEDICAL CENTER | Age: 60
End: 2020-09-15
Payer: COMMERCIAL

## 2020-09-15 PROCEDURE — RXMED WILLOW AMBULATORY MEDICATION CHARGE: Performed by: NURSE PRACTITIONER

## 2020-10-01 DIAGNOSIS — M54.41 CHRONIC MIDLINE LOW BACK PAIN WITH RIGHT-SIDED SCIATICA: ICD-10-CM

## 2020-10-01 DIAGNOSIS — F41.9 ANXIETY: ICD-10-CM

## 2020-10-01 DIAGNOSIS — G89.29 CHRONIC MIDLINE LOW BACK PAIN WITH RIGHT-SIDED SCIATICA: ICD-10-CM

## 2020-10-01 PROCEDURE — RXMED WILLOW AMBULATORY MEDICATION CHARGE: Performed by: NURSE PRACTITIONER

## 2020-10-02 ENCOUNTER — PHARMACY VISIT (OUTPATIENT)
Dept: PHARMACY | Facility: MEDICAL CENTER | Age: 60
End: 2020-10-02
Payer: COMMERCIAL

## 2020-10-02 PROCEDURE — RXMED WILLOW AMBULATORY MEDICATION CHARGE: Performed by: NURSE PRACTITIONER

## 2020-10-02 RX ORDER — GABAPENTIN 300 MG/1
300 CAPSULE ORAL
Qty: 30 CAP | Refills: 3 | Status: SHIPPED | OUTPATIENT
Start: 2020-10-02 | End: 2021-02-01 | Stop reason: SDUPTHER

## 2020-10-02 RX ORDER — ALPRAZOLAM 0.5 MG/1
0.5 TABLET ORAL 3 TIMES DAILY PRN
Qty: 90 TAB | Refills: 0 | Status: SHIPPED | OUTPATIENT
Start: 2020-10-02 | End: 2020-11-03 | Stop reason: SDUPTHER

## 2020-10-05 ENCOUNTER — PHARMACY VISIT (OUTPATIENT)
Dept: PHARMACY | Facility: MEDICAL CENTER | Age: 60
End: 2020-10-05
Payer: COMMERCIAL

## 2020-11-03 DIAGNOSIS — F41.9 ANXIETY: ICD-10-CM

## 2020-11-03 PROCEDURE — RXMED WILLOW AMBULATORY MEDICATION CHARGE: Performed by: NURSE PRACTITIONER

## 2020-11-04 PROCEDURE — RXMED WILLOW AMBULATORY MEDICATION CHARGE: Performed by: NURSE PRACTITIONER

## 2020-11-04 RX ORDER — ALPRAZOLAM 0.5 MG/1
0.5 TABLET ORAL 3 TIMES DAILY PRN
Qty: 90 TAB | Refills: 0 | Status: SHIPPED | OUTPATIENT
Start: 2020-11-04 | End: 2020-11-30 | Stop reason: SDUPTHER

## 2020-11-04 NOTE — TELEPHONE ENCOUNTER
Received request via: Pharmacy    Was the patient seen in the last year in this department? Yes    Does the patient have an active prescription (recently filled or refills available) for medication(s) requested? No     30 day refill, PDMP reviewed.

## 2020-11-05 ENCOUNTER — PHARMACY VISIT (OUTPATIENT)
Dept: PHARMACY | Facility: MEDICAL CENTER | Age: 60
End: 2020-11-05
Payer: COMMERCIAL

## 2020-11-30 DIAGNOSIS — F41.9 ANXIETY: ICD-10-CM

## 2020-11-30 DIAGNOSIS — I10 ESSENTIAL HYPERTENSION: ICD-10-CM

## 2020-11-30 PROCEDURE — RXMED WILLOW AMBULATORY MEDICATION CHARGE: Performed by: NURSE PRACTITIONER

## 2020-12-01 PROCEDURE — RXMED WILLOW AMBULATORY MEDICATION CHARGE: Performed by: FAMILY MEDICINE

## 2020-12-01 RX ORDER — LISINOPRIL 30 MG/1
30 TABLET ORAL DAILY
Qty: 90 TAB | Refills: 3 | Status: SHIPPED | OUTPATIENT
Start: 2020-12-01 | End: 2021-10-14 | Stop reason: SDUPTHER

## 2020-12-02 ENCOUNTER — PHARMACY VISIT (OUTPATIENT)
Dept: PHARMACY | Facility: MEDICAL CENTER | Age: 60
End: 2020-12-02
Payer: COMMERCIAL

## 2020-12-02 DIAGNOSIS — F41.9 ANXIETY: ICD-10-CM

## 2020-12-02 PROCEDURE — RXMED WILLOW AMBULATORY MEDICATION CHARGE: Performed by: NURSE PRACTITIONER

## 2020-12-02 RX ORDER — ALPRAZOLAM 0.5 MG/1
0.5 TABLET ORAL 3 TIMES DAILY PRN
Qty: 90 TAB | Refills: 0 | OUTPATIENT
Start: 2020-12-02 | End: 2021-01-01

## 2020-12-02 RX ORDER — ALPRAZOLAM 0.5 MG/1
0.5 TABLET ORAL 3 TIMES DAILY PRN
Qty: 90 TAB | Refills: 0 | Status: SHIPPED | OUTPATIENT
Start: 2020-12-02 | End: 2020-12-30 | Stop reason: SDUPTHER

## 2020-12-30 DIAGNOSIS — F41.9 ANXIETY: ICD-10-CM

## 2020-12-30 PROCEDURE — RXMED WILLOW AMBULATORY MEDICATION CHARGE: Performed by: NURSE PRACTITIONER

## 2020-12-30 RX ORDER — ALPRAZOLAM 0.5 MG/1
0.5 TABLET ORAL 3 TIMES DAILY PRN
Qty: 90 TAB | Refills: 0 | Status: SHIPPED | OUTPATIENT
Start: 2020-12-30 | End: 2021-02-01 | Stop reason: SDUPTHER

## 2021-01-04 ENCOUNTER — PHARMACY VISIT (OUTPATIENT)
Dept: PHARMACY | Facility: MEDICAL CENTER | Age: 61
End: 2021-01-04
Payer: COMMERCIAL

## 2021-01-04 PROCEDURE — RXMED WILLOW AMBULATORY MEDICATION CHARGE: Performed by: NURSE PRACTITIONER

## 2021-02-01 DIAGNOSIS — M54.41 CHRONIC MIDLINE LOW BACK PAIN WITH RIGHT-SIDED SCIATICA: ICD-10-CM

## 2021-02-01 DIAGNOSIS — F41.9 ANXIETY: ICD-10-CM

## 2021-02-01 DIAGNOSIS — G89.29 CHRONIC MIDLINE LOW BACK PAIN WITH RIGHT-SIDED SCIATICA: ICD-10-CM

## 2021-02-01 PROCEDURE — RXMED WILLOW AMBULATORY MEDICATION CHARGE: Performed by: NURSE PRACTITIONER

## 2021-02-01 PROCEDURE — RXMED WILLOW AMBULATORY MEDICATION CHARGE: Performed by: FAMILY MEDICINE

## 2021-02-01 RX ORDER — ALPRAZOLAM 0.5 MG/1
0.5 TABLET ORAL 3 TIMES DAILY PRN
Qty: 90 TAB | Refills: 0 | Status: SHIPPED | OUTPATIENT
Start: 2021-02-01 | End: 2021-03-01 | Stop reason: SDUPTHER

## 2021-02-01 RX ORDER — GABAPENTIN 300 MG/1
300 CAPSULE ORAL
Qty: 30 CAP | Refills: 5 | Status: SHIPPED | OUTPATIENT
Start: 2021-02-01 | End: 2021-08-18 | Stop reason: SDUPTHER

## 2021-02-02 ENCOUNTER — PHARMACY VISIT (OUTPATIENT)
Dept: PHARMACY | Facility: MEDICAL CENTER | Age: 61
End: 2021-02-02
Payer: COMMERCIAL

## 2021-03-01 ENCOUNTER — PHARMACY VISIT (OUTPATIENT)
Dept: PHARMACY | Facility: MEDICAL CENTER | Age: 61
End: 2021-03-01
Payer: COMMERCIAL

## 2021-03-01 DIAGNOSIS — F41.9 ANXIETY: ICD-10-CM

## 2021-03-01 PROCEDURE — RXMED WILLOW AMBULATORY MEDICATION CHARGE: Performed by: NURSE PRACTITIONER

## 2021-03-01 RX ORDER — ERGOCALCIFEROL 1.25 MG/1
CAPSULE ORAL
Qty: 12 CAPSULE | Refills: 1 | Status: CANCELLED | OUTPATIENT
Start: 2021-03-01 | End: 2022-03-01

## 2021-03-02 ENCOUNTER — PHARMACY VISIT (OUTPATIENT)
Dept: PHARMACY | Facility: MEDICAL CENTER | Age: 61
End: 2021-03-02
Payer: COMMERCIAL

## 2021-03-02 PROCEDURE — RXMED WILLOW AMBULATORY MEDICATION CHARGE: Performed by: NURSE PRACTITIONER

## 2021-03-02 RX ORDER — ALPRAZOLAM 0.5 MG/1
0.5 TABLET ORAL 3 TIMES DAILY PRN
Qty: 90 TABLET | Refills: 0 | Status: SHIPPED | OUTPATIENT
Start: 2021-04-01 | End: 2021-05-01

## 2021-03-02 RX ORDER — ALPRAZOLAM 0.5 MG/1
0.5 TABLET ORAL 3 TIMES DAILY PRN
Qty: 90 TABLET | Refills: 0 | Status: SHIPPED | OUTPATIENT
Start: 2021-03-02 | End: 2021-04-01

## 2021-03-02 RX ORDER — ALPRAZOLAM 0.5 MG/1
0.5 TABLET ORAL 3 TIMES DAILY PRN
Qty: 90 TABLET | Refills: 0 | Status: SHIPPED | OUTPATIENT
Start: 2021-05-01 | End: 2021-05-31

## 2021-03-30 ENCOUNTER — PHARMACY VISIT (OUTPATIENT)
Dept: PHARMACY | Facility: MEDICAL CENTER | Age: 61
End: 2021-03-30
Payer: COMMERCIAL

## 2021-03-30 PROCEDURE — RXMED WILLOW AMBULATORY MEDICATION CHARGE: Performed by: NURSE PRACTITIONER

## 2021-03-30 PROCEDURE — RXMED WILLOW AMBULATORY MEDICATION CHARGE: Performed by: FAMILY MEDICINE

## 2021-04-28 ENCOUNTER — PHARMACY VISIT (OUTPATIENT)
Dept: PHARMACY | Facility: MEDICAL CENTER | Age: 61
End: 2021-04-28
Payer: COMMERCIAL

## 2021-04-28 PROCEDURE — RXMED WILLOW AMBULATORY MEDICATION CHARGE: Performed by: NURSE PRACTITIONER

## 2021-05-19 ENCOUNTER — OFFICE VISIT (OUTPATIENT)
Dept: MEDICAL GROUP | Facility: MEDICAL CENTER | Age: 61
End: 2021-05-19
Attending: NURSE PRACTITIONER
Payer: MEDICAID

## 2021-05-19 ENCOUNTER — PHARMACY VISIT (OUTPATIENT)
Dept: PHARMACY | Facility: MEDICAL CENTER | Age: 61
End: 2021-05-19
Payer: COMMERCIAL

## 2021-05-19 VITALS
TEMPERATURE: 97.7 F | HEART RATE: 69 BPM | BODY MASS INDEX: 33.35 KG/M2 | HEIGHT: 72 IN | WEIGHT: 246.2 LBS | SYSTOLIC BLOOD PRESSURE: 112 MMHG | OXYGEN SATURATION: 92 % | RESPIRATION RATE: 16 BRPM | DIASTOLIC BLOOD PRESSURE: 72 MMHG

## 2021-05-19 DIAGNOSIS — Z13.21 ENCOUNTER FOR VITAMIN DEFICIENCY SCREENING: ICD-10-CM

## 2021-05-19 DIAGNOSIS — F41.9 ANXIETY: ICD-10-CM

## 2021-05-19 DIAGNOSIS — E78.5 HYPERLIPIDEMIA, UNSPECIFIED HYPERLIPIDEMIA TYPE: ICD-10-CM

## 2021-05-19 DIAGNOSIS — N52.9 ERECTILE DYSFUNCTION, UNSPECIFIED ERECTILE DYSFUNCTION TYPE: ICD-10-CM

## 2021-05-19 DIAGNOSIS — Z12.11 SCREEN FOR COLON CANCER: ICD-10-CM

## 2021-05-19 DIAGNOSIS — Z13.228 SCREENING FOR METABOLIC DISORDER: ICD-10-CM

## 2021-05-19 PROCEDURE — 99212 OFFICE O/P EST SF 10 MIN: CPT | Performed by: NURSE PRACTITIONER

## 2021-05-19 PROCEDURE — 99214 OFFICE O/P EST MOD 30 MIN: CPT | Performed by: NURSE PRACTITIONER

## 2021-05-19 PROCEDURE — RXMED WILLOW AMBULATORY MEDICATION CHARGE: Performed by: NURSE PRACTITIONER

## 2021-05-19 RX ORDER — CHOLECALCIFEROL (VITAMIN D3) 1250 MCG
CAPSULE ORAL
COMMUNITY
Start: 2021-03-02 | End: 2021-05-19

## 2021-05-19 RX ORDER — ALPRAZOLAM 0.5 MG/1
0.5 TABLET ORAL 3 TIMES DAILY PRN
Qty: 75 TABLET | Refills: 0 | Status: SHIPPED | OUTPATIENT
Start: 2021-07-18 | End: 2021-08-18 | Stop reason: SDUPTHER

## 2021-05-19 RX ORDER — ALPRAZOLAM 0.5 MG/1
0.5 TABLET ORAL 3 TIMES DAILY PRN
Qty: 75 TABLET | Refills: 0 | Status: SHIPPED | OUTPATIENT
Start: 2021-05-19 | End: 2021-06-26

## 2021-05-19 RX ORDER — ALPRAZOLAM 0.5 MG/1
0.5 TABLET ORAL 3 TIMES DAILY PRN
Qty: 90 TABLET | Refills: 0 | Status: CANCELLED | OUTPATIENT
Start: 2021-05-19 | End: 2021-06-18

## 2021-05-19 RX ORDER — ATORVASTATIN CALCIUM 20 MG/1
20 TABLET, FILM COATED ORAL DAILY
Qty: 30 TABLET | Refills: 3 | Status: SHIPPED | OUTPATIENT
Start: 2021-05-19 | End: 2021-08-18 | Stop reason: SDUPTHER

## 2021-05-19 RX ORDER — ALPRAZOLAM 0.5 MG/1
0.5 TABLET ORAL 3 TIMES DAILY PRN
Qty: 75 TABLET | Refills: 0 | Status: SHIPPED | OUTPATIENT
Start: 2021-06-18 | End: 2021-07-24

## 2021-05-19 ASSESSMENT — ENCOUNTER SYMPTOMS
COUGH: 0
WHEEZING: 0
BLOOD IN STOOL: 0
DIARRHEA: 0
ABDOMINAL PAIN: 0
SHORTNESS OF BREATH: 0
CONSTIPATION: 0
WEIGHT LOSS: 0
PALPITATIONS: 0
CHILLS: 0
FEVER: 0

## 2021-05-19 ASSESSMENT — PATIENT HEALTH QUESTIONNAIRE - PHQ9
SUM OF ALL RESPONSES TO PHQ9 QUESTIONS 1 AND 2: 0
SUM OF ALL RESPONSES TO PHQ QUESTIONS 1-9: 0
3. TROUBLE FALLING OR STAYING ASLEEP OR SLEEPING TOO MUCH: NOT AT ALL
1. LITTLE INTEREST OR PLEASURE IN DOING THINGS: NOT AT ALL
2. FEELING DOWN, DEPRESSED, IRRITABLE, OR HOPELESS: NOT AT ALL
7. TROUBLE CONCENTRATING ON THINGS, SUCH AS READING THE NEWSPAPER OR WATCHING TELEVISION: NOT AT ALL
4. FEELING TIRED OR HAVING LITTLE ENERGY: NOT AT ALL
8. MOVING OR SPEAKING SO SLOWLY THAT OTHER PEOPLE COULD HAVE NOTICED. OR THE OPPOSITE, BEING SO FIGETY OR RESTLESS THAT YOU HAVE BEEN MOVING AROUND A LOT MORE THAN USUAL: NOT AT ALL
5. POOR APPETITE OR OVEREATING: NOT AT ALL
6. FEELING BAD ABOUT YOURSELF - OR THAT YOU ARE A FAILURE OR HAVE LET YOURSELF OR YOUR FAMILY DOWN: NOT AL ALL
9. THOUGHTS THAT YOU WOULD BE BETTER OFF DEAD, OR OF HURTING YOURSELF: NOT AT ALL

## 2021-05-19 NOTE — PROGRESS NOTES
Chief Complaint   Patient presents with   • Follow-Up     medication       Subjective:     HPI:   Sammy Romo is a 61 y.o. male here to discuss the evaluation and management of:        Erectile dysfunction  Present for approximately 1 year.  Patient reports this was a slow onset.  He does not wake with erections and is unable to maintain an erection for sexual activity.  He reports he has never had treatment in the past.  He denies trauma to his penis.    Anxiety  Patient reports that he experiences benefit from his anxiety when taking Xanax.  He denies side effects from medication.  The anniversary of his son's murder is coming up in July, this is traditionally a very difficult time for him.  He is open to lowering his dose.      ROS  Review of Systems   Constitutional: Negative for chills, fever, malaise/fatigue and weight loss.   Respiratory: Negative for cough, shortness of breath and wheezing.    Cardiovascular: Negative for chest pain, palpitations and leg swelling.   Gastrointestinal: Negative for abdominal pain, blood in stool, constipation and diarrhea.         No Known Allergies    Current medicines (including changes today)  Current Outpatient Medications   Medication Sig Dispense Refill   • atorvastatin (LIPITOR) 20 MG Tab Take 1 tablet by mouth every day. 30 tablet 3   • ALPRAZolam (XANAX) 0.5 MG Tab Take 1 tablet by mouth 3 times a day as needed for Anxiety for up to 30 days. 75 tablet 0   • [START ON 6/18/2021] ALPRAZolam (XANAX) 0.5 MG Tab Take 1 tablet by mouth 3 times a day as needed for Anxiety for up to 30 days. 75 tablet 0   • [START ON 7/18/2021] ALPRAZolam (XANAX) 0.5 MG Tab Take 1 tablet by mouth 3 times a day as needed for Anxiety for up to 30 days. 75 tablet 0   • vitamin D, Ergocalciferol, (DRISDOL) 1.25 MG (29046 UT) Cap capsule Take 1 capsule by mouth every 28 days. 4 capsule 0   • gabapentin (NEURONTIN) 300 MG Cap Take 1 capsule by mouth every bedtime. 30 Cap 5   • lisinopril  (PRINIVIL) 30 MG tablet Take 1 Tab by mouth every day. For high blood pressure 90 Tab 3   • cyclobenzaprine (FLEXERIL) 10 mg Tab Take 1 Tab by mouth 3 times a day as needed. 90 Tab 3   • ALPRAZolam (XANAX) 0.5 MG Tab Take 1 tablet by mouth 3 times a day as needed for Anxiety for up to 30 days. 90 tablet 0     No current facility-administered medications for this visit.       Social History     Tobacco Use   • Smoking status: Never Smoker   • Smokeless tobacco: Never Used   Vaping Use   • Vaping Use: Never used   Substance Use Topics   • Alcohol use: Yes     Comment: socially, moderate   • Drug use: No       Patient Active Problem List    Diagnosis Date Noted   • Erectile dysfunction 05/19/2021   • Vitamin D deficiency 06/11/2020   • Post-traumatic stress disorder, unspecified 05/04/2020   • Anxiety 12/05/2019   • Impacted cerumen of right ear 12/05/2019   • Major depressive disorder 11/06/2019   • Encounter to establish care 09/04/2019   • Essential hypertension 09/04/2019   • Chronic midline low back pain with right-sided sciatica 09/04/2019   • Obesity (BMI 30-39.9) 09/04/2019       Family History   Problem Relation Age of Onset   • Hypertension Maternal Grandfather    • Heart Disease Maternal Grandfather           Objective:     /72 (BP Location: Left arm, Patient Position: Sitting, BP Cuff Size: Adult)   Pulse 69   Temp 36.5 °C (97.7 °F) (Temporal)   Resp 16   Ht 1.829 m (6')   Wt 112 kg (246 lb 3.2 oz)   SpO2 92%  Body mass index is 33.39 kg/m².    Physical Exam:  Physical Exam  Constitutional:       General: He is not in acute distress.  HENT:      Head: Normocephalic.      Right Ear: Tympanic membrane and external ear normal.      Left Ear: Tympanic membrane and external ear normal.   Eyes:      Conjunctiva/sclera: Conjunctivae normal.      Pupils: Pupils are equal, round, and reactive to light.   Neck:      Trachea: No tracheal deviation.   Cardiovascular:      Rate and Rhythm: Normal rate and  regular rhythm.      Heart sounds: Normal heart sounds.   Pulmonary:      Effort: Pulmonary effort is normal.      Breath sounds: Normal breath sounds.   Abdominal:      General: Bowel sounds are normal.      Palpations: Abdomen is soft.   Musculoskeletal:         General: Normal range of motion.      Cervical back: Normal range of motion and neck supple.   Lymphadenopathy:      Head:      Right side of head: No preauricular adenopathy.      Left side of head: No preauricular adenopathy.      Cervical: No cervical adenopathy.   Skin:     General: Skin is warm and dry.   Neurological:      Mental Status: He is alert and oriented to person, place, and time.      Cranial Nerves: Cranial nerves are intact.      Sensory: Sensation is intact.      Gait: Gait is intact.   Psychiatric:         Mood and Affect: Affect normal.         Judgment: Judgment normal.         Assessment and Plan:     The following treatment plan was discussed:    1. Erectile dysfunction, unspecified erectile dysfunction type  PROSTATE SPECIFIC AG SCREENING    TESTOSTERONE SERUM  -Chronic problem, unstable.  We discussed the vascular issues associated with edema.  We discussed the importance of cholesterol control.  Patient is agreeable to initiate atorvastatin 20 mg daily.  We will check his PSA.  We did discuss the pathophysiology of ED and mechanism of action of medications like Viagra.   2. Hyperlipidemia, unspecified hyperlipidemia type  atorvastatin (LIPITOR) 20 MG Tab  -Chronic problem, unstable.  Patient's 10-year ASCVD risk is 11.1%, patient is agreeable to start atorvastatin 20 mg daily. Potential side effects and discontinuation criteria were discussed with patient, patient verbalized understanding.     3. Anxiety  ALPRAZolam (XANAX) 0.5 MG Tab    ALPRAZolam (XANAX) 0.5 MG Tab    ALPRAZolam (XANAX) 0.5 MG Tab  -Chronic problem, stable.  We will decrease his Xanax dose, #75 monthly as opposed to #90.  We discussed the anniversary of his  son's death is coming up, he generally travels to [place] on this anniversary.  If needed he can return prior to travel and we can increase his dose.  ER precautions reviewed.   4. Encounter for vitamin deficiency screening  VITAMIN D,25 HYDROXY   5. Screening for metabolic disorder  HEMOGLOBIN A1C   6. Screen for colon cancer  OCCULT BLOOD FECES IMMUNOASSAY (FIT)       Any change or worsening of signs or symptoms, patient encouraged to follow-up or report to emergency room for further evaluation. Patient verbalizes understanding and agrees.    Follow-Up: Return for TBD by lab results or sooner as needed.      PLEASE NOTE: This dictation was created using voice recognition software. I have made every reasonable attempt to correct obvious errors, but I expect that there are errors of grammar and possibly content that I did not discover before finalizing the note.

## 2021-05-19 NOTE — ASSESSMENT & PLAN NOTE
Patient reports that he experiences benefit from his anxiety when taking Xanax.  He denies side effects from medication.  The anniversary of his son's murder is coming up in July, this is traditionally a very difficult time for him.  He is open to lowering his dose.

## 2021-05-19 NOTE — ASSESSMENT & PLAN NOTE
Present for approximately 1 year.  Patient reports this was a slow onset.  He does not wake with erections and is unable to maintain an erection for sexual activity.  He reports he has never had treatment in the past.  He denies trauma to his penis.

## 2021-05-25 PROCEDURE — RXMED WILLOW AMBULATORY MEDICATION CHARGE: Performed by: NURSE PRACTITIONER

## 2021-05-27 ENCOUNTER — PHARMACY VISIT (OUTPATIENT)
Dept: PHARMACY | Facility: MEDICAL CENTER | Age: 61
End: 2021-05-27
Payer: COMMERCIAL

## 2021-05-27 PROCEDURE — RXMED WILLOW AMBULATORY MEDICATION CHARGE: Performed by: NURSE PRACTITIONER

## 2021-05-28 ENCOUNTER — PHARMACY VISIT (OUTPATIENT)
Dept: PHARMACY | Facility: MEDICAL CENTER | Age: 61
End: 2021-05-28
Payer: COMMERCIAL

## 2021-06-02 ENCOUNTER — HOSPITAL ENCOUNTER (OUTPATIENT)
Dept: LAB | Facility: MEDICAL CENTER | Age: 61
End: 2021-06-02
Attending: NURSE PRACTITIONER
Payer: MEDICAID

## 2021-06-02 ENCOUNTER — HOSPITAL ENCOUNTER (OUTPATIENT)
Facility: MEDICAL CENTER | Age: 61
End: 2021-06-02
Attending: NURSE PRACTITIONER
Payer: MEDICAID

## 2021-06-02 DIAGNOSIS — Z13.228 SCREENING FOR METABOLIC DISORDER: ICD-10-CM

## 2021-06-02 DIAGNOSIS — N52.9 ERECTILE DYSFUNCTION, UNSPECIFIED ERECTILE DYSFUNCTION TYPE: ICD-10-CM

## 2021-06-02 DIAGNOSIS — Z13.21 ENCOUNTER FOR VITAMIN DEFICIENCY SCREENING: ICD-10-CM

## 2021-06-02 LAB
EST. AVERAGE GLUCOSE BLD GHB EST-MCNC: 137 MG/DL
HBA1C MFR BLD: 6.4 % (ref 4–5.6)
PSA SERPL-MCNC: 0.28 NG/ML (ref 0–4)

## 2021-06-02 PROCEDURE — 83036 HEMOGLOBIN GLYCOSYLATED A1C: CPT

## 2021-06-02 PROCEDURE — 82306 VITAMIN D 25 HYDROXY: CPT

## 2021-06-02 PROCEDURE — 84403 ASSAY OF TOTAL TESTOSTERONE: CPT

## 2021-06-02 PROCEDURE — 82274 ASSAY TEST FOR BLOOD FECAL: CPT

## 2021-06-02 PROCEDURE — 36415 COLL VENOUS BLD VENIPUNCTURE: CPT

## 2021-06-02 PROCEDURE — 84153 ASSAY OF PSA TOTAL: CPT

## 2021-06-04 DIAGNOSIS — Z12.11 SCREEN FOR COLON CANCER: ICD-10-CM

## 2021-06-04 LAB
25(OH)D3 SERPL-MCNC: 21 NG/ML (ref 30–80)
TESTOST SERPL-MCNC: 43 NG/DL (ref 300–720)

## 2021-06-06 LAB — IMM ASSAY OCC BLD FITOB: NEGATIVE

## 2021-06-09 ENCOUNTER — TELEPHONE (OUTPATIENT)
Dept: MEDICAL GROUP | Facility: MEDICAL CENTER | Age: 61
End: 2021-06-09

## 2021-06-09 NOTE — TELEPHONE ENCOUNTER
Phone Number Called: [phone] (home)     Call outcome: Unable to lvm    Message: Attempted to relay lab results to pt. Pt. Did not answer and his vmb is not set up to [facility] vm's.

## 2021-06-09 NOTE — RESULT ENCOUNTER NOTE
Please call pt and let them know that I got his lab results.  His stool test looking for signs of colon cancer was negative.  I would like him to schedule an appt to discuss his low testosterone and high blood sugar. thanks

## 2021-06-10 ENCOUNTER — TELEPHONE (OUTPATIENT)
Dept: MEDICAL GROUP | Facility: MEDICAL CENTER | Age: 61
End: 2021-06-10

## 2021-06-10 NOTE — TELEPHONE ENCOUNTER
----- Message from PREMA Peters sent at 6/9/2021  8:57 AM PDT -----  Please call pt and let them know that I got his lab results.  His stool test looking for signs of colon cancer was negative.  I would like him to schedule an appt to discuss his low testosterone and high blood sugar. thanks

## 2021-06-10 NOTE — TELEPHONE ENCOUNTER
Phone Number Called: [phone] (home)     Call outcome: Spoke to patient regarding message below.    Message: Relayed pt.s lab result to him. He understood, had no questions, and scheduled an appt. To see his provider next week to discuss low testosterone and high blood sugar.

## 2021-06-15 ENCOUNTER — OFFICE VISIT (OUTPATIENT)
Dept: MEDICAL GROUP | Facility: MEDICAL CENTER | Age: 61
End: 2021-06-15
Attending: NURSE PRACTITIONER
Payer: MEDICAID

## 2021-06-15 ENCOUNTER — TELEPHONE (OUTPATIENT)
Dept: MEDICAL GROUP | Facility: MEDICAL CENTER | Age: 61
End: 2021-06-15

## 2021-06-15 VITALS
RESPIRATION RATE: 16 BRPM | OXYGEN SATURATION: 92 % | HEART RATE: 62 BPM | DIASTOLIC BLOOD PRESSURE: 80 MMHG | WEIGHT: 231.7 LBS | HEIGHT: 72 IN | SYSTOLIC BLOOD PRESSURE: 128 MMHG | BODY MASS INDEX: 31.38 KG/M2 | TEMPERATURE: 97 F

## 2021-06-15 DIAGNOSIS — E55.9 VITAMIN D DEFICIENCY: ICD-10-CM

## 2021-06-15 DIAGNOSIS — R79.89 LOW TESTOSTERONE: ICD-10-CM

## 2021-06-15 PROCEDURE — 99213 OFFICE O/P EST LOW 20 MIN: CPT | Performed by: NURSE PRACTITIONER

## 2021-06-15 PROCEDURE — 700111 HCHG RX REV CODE 636 W/ 250 OVERRIDE (IP): Performed by: NURSE PRACTITIONER

## 2021-06-15 PROCEDURE — 96372 THER/PROPH/DIAG INJ SC/IM: CPT | Performed by: NURSE PRACTITIONER

## 2021-06-15 PROCEDURE — RXMED WILLOW AMBULATORY MEDICATION CHARGE: Performed by: NURSE PRACTITIONER

## 2021-06-15 PROCEDURE — 99213 OFFICE O/P EST LOW 20 MIN: CPT | Mod: 25 | Performed by: NURSE PRACTITIONER

## 2021-06-15 RX ORDER — ERGOCALCIFEROL 1.25 MG/1
50000 CAPSULE ORAL
Qty: 8 CAPSULE | Refills: 1 | Status: SHIPPED | OUTPATIENT
Start: 2021-06-15 | End: 2021-08-18 | Stop reason: SDUPTHER

## 2021-06-15 RX ORDER — TESTOSTERONE CYPIONATE 200 MG/ML
100 INJECTION, SOLUTION INTRAMUSCULAR
Status: COMPLETED | OUTPATIENT
Start: 2021-06-15 | End: 2021-08-31

## 2021-06-15 RX ADMIN — TESTOSTERONE CYPIONATE 100 MG: 200 INJECTION, SOLUTION INTRAMUSCULAR at 16:38

## 2021-06-15 ASSESSMENT — ENCOUNTER SYMPTOMS
FEVER: 0
COUGH: 0
WEIGHT LOSS: 0
PALPITATIONS: 0
ABDOMINAL PAIN: 0
WHEEZING: 0
SHORTNESS OF BREATH: 0
DIARRHEA: 0
BLOOD IN STOOL: 0
CONSTIPATION: 0
CHILLS: 0

## 2021-06-15 NOTE — ASSESSMENT & PLAN NOTE
Patient reports daily symptoms for approximately 1 year.  Laboratory testing of testosterone level revealed his testosterone level was low at 43, his PSA was 0.28.    Patient is interested in testosterone therapy    Contraindications:  Prostate Cancer: NO    Breast Cancer: NO  Severe lower urinary tract symptoms: NO  Hematocrit greater than 50: NO  Severe untreated sleep apnea: NO  Uncontrolled heart failure: NO

## 2021-06-15 NOTE — PROGRESS NOTES
Chief Complaint   Patient presents with   • Follow-Up       Subjective:     HPI:   Sammy Romo is a 61 y.o. male here to discuss the evaluation and management of:        Low testosterone  Patient reports daily symptoms for approximately 1 year.  Laboratory testing of testosterone level revealed his testosterone level was low at 43, his PSA was 0.28.    Patient is interested in testosterone therapy    Contraindications:  Prostate Cancer: NO    Breast Cancer: NO  Severe lower urinary tract symptoms: NO  Hematocrit greater than 50: NO  Severe untreated sleep apnea: NO  Uncontrolled heart failure: NO            ROS  Review of Systems   Constitutional: Positive for malaise/fatigue. Negative for chills, fever and weight loss.   Respiratory: Negative for cough, shortness of breath and wheezing.    Cardiovascular: Negative for chest pain, palpitations and leg swelling.   Gastrointestinal: Negative for abdominal pain, blood in stool, constipation and diarrhea.         No Known Allergies    Current medicines (including changes today)  Current Outpatient Medications   Medication Sig Dispense Refill   • vitamin D, Ergocalciferol, (DRISDOL) 1.25 MG (83809 UT) Cap capsule Take 1 capsule by mouth every 7 days. 8 capsule 1   • atorvastatin (LIPITOR) 20 MG Tab Take 1 tablet by mouth every day. 30 tablet 3   • ALPRAZolam (XANAX) 0.5 MG Tab Take 1 tablet by mouth 3 times a day as needed for Anxiety for up to 30 days. 75 tablet 0   • gabapentin (NEURONTIN) 300 MG Cap Take 1 capsule by mouth every bedtime. 30 Cap 5   • lisinopril (PRINIVIL) 30 MG tablet Take 1 Tab by mouth every day. For high blood pressure 90 Tab 3   • cyclobenzaprine (FLEXERIL) 10 mg Tab Take 1 Tab by mouth 3 times a day as needed. 90 Tab 3   • [START ON 6/18/2021] ALPRAZolam (XANAX) 0.5 MG Tab Take 1 tablet by mouth 3 times a day as needed for Anxiety for up to 30 days. DNFU 6/18 75 tablet 0   • [START ON 7/18/2021] ALPRAZolam (XANAX) 0.5 MG Tab Take 1 tablet by  mouth 3 times a day as needed for Anxiety for up to 30 days. 75 tablet 0     Current Facility-Administered Medications   Medication Dose Route Frequency Provider Last Rate Last Admin   • testosterone cypionate (DEPO-TESTOSTERONE) injection 100 mg  100 mg Intramuscular Q14 DAYS PREMA Peters           Social History     Tobacco Use   • Smoking status: Never Smoker   • Smokeless tobacco: Never Used   Vaping Use   • Vaping Use: Never used   Substance Use Topics   • Alcohol use: Yes     Comment: socially, moderate   • Drug use: No       Patient Active Problem List    Diagnosis Date Noted   • Low testosterone 06/15/2021   • Erectile dysfunction 05/19/2021   • Vitamin D deficiency 06/11/2020   • Post-traumatic stress disorder, unspecified 05/04/2020   • Anxiety 12/05/2019   • Impacted cerumen of right ear 12/05/2019   • Major depressive disorder 11/06/2019   • Encounter to establish care 09/04/2019   • Essential hypertension 09/04/2019   • Chronic midline low back pain with right-sided sciatica 09/04/2019   • Obesity (BMI 30-39.9) 09/04/2019       Family History   Problem Relation Age of Onset   • Hypertension Maternal Grandfather    • Heart Disease Maternal Grandfather           Objective:     /80 (BP Location: Left arm, Patient Position: Sitting, BP Cuff Size: Adult)   Pulse 62   Temp 36.1 °C (97 °F) (Temporal)   Resp 16   Ht 1.829 m (6')   Wt 105 kg (231 lb 11.2 oz)   SpO2 92%  Body mass index is 31.42 kg/m².    Physical Exam:  Physical Exam  Constitutional:       General: He is not in acute distress.  HENT:      Head: Normocephalic.      Right Ear: Tympanic membrane and external ear normal.      Left Ear: Tympanic membrane and external ear normal.   Eyes:      Conjunctiva/sclera: Conjunctivae normal.      Pupils: Pupils are equal, round, and reactive to light.   Neck:      Trachea: No tracheal deviation.   Cardiovascular:      Rate and Rhythm: Normal rate and regular rhythm.      Heart sounds:  Normal heart sounds.   Pulmonary:      Effort: Pulmonary effort is normal.      Breath sounds: Normal breath sounds.   Abdominal:      General: Bowel sounds are normal.      Palpations: Abdomen is soft.   Musculoskeletal:         General: Normal range of motion.      Cervical back: Normal range of motion and neck supple.   Lymphadenopathy:      Head:      Right side of head: No preauricular adenopathy.      Left side of head: No preauricular adenopathy.      Cervical: No cervical adenopathy.   Skin:     General: Skin is warm and dry.   Neurological:      Mental Status: He is alert and oriented to person, place, and time.      Cranial Nerves: Cranial nerves are intact.      Sensory: Sensation is intact.      Gait: Gait is intact.   Psychiatric:         Mood and Affect: Affect normal.         Judgment: Judgment normal.     I have placed the below orders and discussed them with an approved delegating provider.  The MA is performing the below orders under the direction of Dr. Gutiérrez.      Assessment and Plan:     The following treatment plan was discussed:    1. Low testosterone  testosterone cypionate (DEPO-TESTOSTERONE) injection 100 mg  -Chronic problem, unstable.  We discussed the risks and benefit, patient is interested in going forward with testosterone injections.  We will do 100 mg every 2 weeks for 6 injections.  At that point we will repeat his lab work including CBC, PSA, and testosterone level.   2. Vitamin D deficiency  vitamin D, Ergocalciferol, (DRISDOL) 1.25 MG (68457 UT) Cap capsule  -Chronic problem, unstable.  Latest vitamin D level was 21, we will go back to ergocalciferol 50,000 units weekly.       Any change or worsening of signs or symptoms, patient encouraged to follow-up or report to emergency room for further evaluation. Patient verbalizes understanding and agrees.    Follow-Up: Return in about 2 months (around 8/15/2021) for Controlled substance management.      PLEASE NOTE: This dictation was  created using voice recognition software. I have made every reasonable attempt to correct obvious errors, but I expect that there are errors of grammar and possibly content that I did not discover before finalizing the note.

## 2021-06-16 ENCOUNTER — PHARMACY VISIT (OUTPATIENT)
Dept: PHARMACY | Facility: MEDICAL CENTER | Age: 61
End: 2021-06-16
Payer: COMMERCIAL

## 2021-06-16 NOTE — TELEPHONE ENCOUNTER
"· Care Coordination with Benzodiazepine Prescriber paperwork received from [facility] requiring provider signature.     · All appropriate fields completed by Medical Assistant: yes - N/A    · Paperwork placed in \"MA to Provider\" folder/basket. Awaiting provider completion/signature.     PT. Was seen in office., closing encounter  "

## 2021-06-22 PROCEDURE — RXMED WILLOW AMBULATORY MEDICATION CHARGE: Performed by: NURSE PRACTITIONER

## 2021-06-23 PROCEDURE — RXMED WILLOW AMBULATORY MEDICATION CHARGE: Performed by: NURSE PRACTITIONER

## 2021-06-24 ENCOUNTER — PHARMACY VISIT (OUTPATIENT)
Dept: PHARMACY | Facility: MEDICAL CENTER | Age: 61
End: 2021-06-24
Payer: COMMERCIAL

## 2021-06-29 ENCOUNTER — NON-PROVIDER VISIT (OUTPATIENT)
Dept: MEDICAL GROUP | Facility: MEDICAL CENTER | Age: 61
End: 2021-06-29
Attending: NURSE PRACTITIONER
Payer: MEDICAID

## 2021-06-29 PROCEDURE — 700111 HCHG RX REV CODE 636 W/ 250 OVERRIDE (IP): Performed by: NURSE PRACTITIONER

## 2021-06-29 PROCEDURE — 96372 THER/PROPH/DIAG INJ SC/IM: CPT | Performed by: NURSE PRACTITIONER

## 2021-06-29 RX ADMIN — TESTOSTERONE CYPIONATE 100 MG: 200 INJECTION, SOLUTION INTRAMUSCULAR at 10:42

## 2021-06-29 NOTE — PROGRESS NOTES
Sammy Romo is a 61 y.o. male here for a non-provider visit for depo-testosterone    If abnormal was an in office provider notified today (if so, indicate provider)? No    Routed to PCP? Yes  Pt was placed in room , first and last name verified, 100mg testosterone verified, injection was given on left side. Patient tolerated injection well.

## 2021-06-30 ENCOUNTER — TELEPHONE (OUTPATIENT)
Dept: MEDICAL GROUP | Facility: MEDICAL CENTER | Age: 61
End: 2021-06-30

## 2021-06-30 NOTE — TELEPHONE ENCOUNTER
Phone Number Called: [phone] (home)     Call outcome: Spoke to patient regarding message below.    Message: Relayed message to pt. Pt understood and had no questions. He states that it is no longer an issue because he is no longer going to the [facility] and did not want to schedule an appt. At this time.

## 2021-06-30 NOTE — TELEPHONE ENCOUNTER
----- Message from PREMA Peters sent at 6/30/2021  7:42 AM PDT -----  Please call the patient and let him know that I got a [facility] to [facility] treatment.  I would like him to get him scheduled for next week so that we can review the plan of tapering down his xanax.  Thank you

## 2021-07-13 ENCOUNTER — NON-PROVIDER VISIT (OUTPATIENT)
Dept: MEDICAL GROUP | Facility: MEDICAL CENTER | Age: 61
End: 2021-07-13
Attending: NURSE PRACTITIONER
Payer: MEDICAID

## 2021-07-13 PROCEDURE — RXMED WILLOW AMBULATORY MEDICATION CHARGE: Performed by: FAMILY MEDICINE

## 2021-07-13 PROCEDURE — RXMED WILLOW AMBULATORY MEDICATION CHARGE: Performed by: NURSE PRACTITIONER

## 2021-07-13 PROCEDURE — 700111 HCHG RX REV CODE 636 W/ 250 OVERRIDE (IP): Performed by: NURSE PRACTITIONER

## 2021-07-13 PROCEDURE — 96372 THER/PROPH/DIAG INJ SC/IM: CPT | Performed by: NURSE PRACTITIONER

## 2021-07-13 RX ADMIN — TESTOSTERONE CYPIONATE 100 MG: 200 INJECTION, SOLUTION INTRAMUSCULAR at 10:59

## 2021-07-13 NOTE — PROGRESS NOTES
Sammy Romo is a 61 y.o. male here for a non-provider visit for testosterone  injection.    Reason for injection: ordered  Order in MAR?: Yes  Patient supplied?:no  Minimum interval has been met for this injection (per MAR order): Yes    Patient tolerated injection and no adverse effects were observed or reported: Yes    # of Administrations remaining in MAR:

## 2021-07-14 ENCOUNTER — PHARMACY VISIT (OUTPATIENT)
Dept: PHARMACY | Facility: MEDICAL CENTER | Age: 61
End: 2021-07-14
Payer: COMMERCIAL

## 2021-07-22 ENCOUNTER — PHARMACY VISIT (OUTPATIENT)
Dept: PHARMACY | Facility: MEDICAL CENTER | Age: 61
End: 2021-07-22
Payer: COMMERCIAL

## 2021-07-22 PROCEDURE — RXMED WILLOW AMBULATORY MEDICATION CHARGE: Performed by: NURSE PRACTITIONER

## 2021-07-27 ENCOUNTER — NON-PROVIDER VISIT (OUTPATIENT)
Dept: MEDICAL GROUP | Facility: MEDICAL CENTER | Age: 61
End: 2021-07-27
Attending: NURSE PRACTITIONER
Payer: MEDICAID

## 2021-07-27 PROCEDURE — 96372 THER/PROPH/DIAG INJ SC/IM: CPT

## 2021-07-27 PROCEDURE — 99999 PR NO CHARGE: CPT | Performed by: NURSE PRACTITIONER

## 2021-07-27 PROCEDURE — 700111 HCHG RX REV CODE 636 W/ 250 OVERRIDE (IP): Performed by: NURSE PRACTITIONER

## 2021-07-27 RX ADMIN — TESTOSTERONE CYPIONATE 100 MG: 200 INJECTION, SOLUTION INTRAMUSCULAR at 10:14

## 2021-08-10 ENCOUNTER — NON-PROVIDER VISIT (OUTPATIENT)
Dept: MEDICAL GROUP | Facility: MEDICAL CENTER | Age: 61
End: 2021-08-10
Attending: FAMILY MEDICINE
Payer: MEDICAID

## 2021-08-10 PROCEDURE — 99999 PR NO CHARGE: CPT | Performed by: FAMILY MEDICINE

## 2021-08-10 PROCEDURE — 700111 HCHG RX REV CODE 636 W/ 250 OVERRIDE (IP): Performed by: NURSE PRACTITIONER

## 2021-08-10 PROCEDURE — 96372 THER/PROPH/DIAG INJ SC/IM: CPT | Performed by: FAMILY MEDICINE

## 2021-08-10 RX ADMIN — TESTOSTERONE CYPIONATE 100 MG: 200 INJECTION, SOLUTION INTRAMUSCULAR at 11:38

## 2021-08-10 NOTE — PROGRESS NOTES
Sammy Romo is a 61 y.o. male here for a non-provider visit for testosterne injection.    Reason for injection: ordered  Order in MAR?: Yes  Patient supplied?:No  Minimum interval has been met for this injection (per MAR order): Yes    Patient tolerated injection and no adverse effects were observed or reported: Yes    # of Administrations remaining in MAR:

## 2021-08-18 ENCOUNTER — OFFICE VISIT (OUTPATIENT)
Dept: MEDICAL GROUP | Facility: MEDICAL CENTER | Age: 61
End: 2021-08-18
Attending: NURSE PRACTITIONER
Payer: MEDICAID

## 2021-08-18 VITALS
SYSTOLIC BLOOD PRESSURE: 142 MMHG | HEIGHT: 72 IN | DIASTOLIC BLOOD PRESSURE: 88 MMHG | TEMPERATURE: 97.5 F | RESPIRATION RATE: 16 BRPM | OXYGEN SATURATION: 93 % | BODY MASS INDEX: 30.53 KG/M2 | WEIGHT: 225.4 LBS | HEART RATE: 60 BPM

## 2021-08-18 DIAGNOSIS — F41.9 ANXIETY: ICD-10-CM

## 2021-08-18 DIAGNOSIS — N52.9 ERECTILE DYSFUNCTION, UNSPECIFIED ERECTILE DYSFUNCTION TYPE: ICD-10-CM

## 2021-08-18 DIAGNOSIS — E55.9 VITAMIN D DEFICIENCY: ICD-10-CM

## 2021-08-18 DIAGNOSIS — R73.09 ELEVATED HEMOGLOBIN A1C: ICD-10-CM

## 2021-08-18 DIAGNOSIS — E78.5 HYPERLIPIDEMIA, UNSPECIFIED HYPERLIPIDEMIA TYPE: ICD-10-CM

## 2021-08-18 DIAGNOSIS — M54.41 CHRONIC MIDLINE LOW BACK PAIN WITH RIGHT-SIDED SCIATICA: ICD-10-CM

## 2021-08-18 DIAGNOSIS — G89.29 CHRONIC MIDLINE LOW BACK PAIN WITH RIGHT-SIDED SCIATICA: ICD-10-CM

## 2021-08-18 DIAGNOSIS — R79.89 LOW TESTOSTERONE: ICD-10-CM

## 2021-08-18 PROCEDURE — 99214 OFFICE O/P EST MOD 30 MIN: CPT | Performed by: NURSE PRACTITIONER

## 2021-08-18 PROCEDURE — 99213 OFFICE O/P EST LOW 20 MIN: CPT | Performed by: NURSE PRACTITIONER

## 2021-08-18 PROCEDURE — RXMED WILLOW AMBULATORY MEDICATION CHARGE: Performed by: NURSE PRACTITIONER

## 2021-08-18 RX ORDER — ALPRAZOLAM 0.5 MG/1
0.5 TABLET ORAL 3 TIMES DAILY PRN
Qty: 75 TABLET | Refills: 0 | Status: SHIPPED | OUTPATIENT
Start: 2021-09-21 | End: 2021-10-21

## 2021-08-18 RX ORDER — GABAPENTIN 300 MG/1
300 CAPSULE ORAL
Qty: 30 CAPSULE | Refills: 5 | Status: SHIPPED | OUTPATIENT
Start: 2021-08-18 | End: 2022-04-08 | Stop reason: SDUPTHER

## 2021-08-18 RX ORDER — ALPRAZOLAM 0.5 MG/1
0.5 TABLET ORAL 3 TIMES DAILY PRN
Qty: 75 TABLET | Refills: 0 | Status: SHIPPED | OUTPATIENT
Start: 2021-10-21 | End: 2021-11-20

## 2021-08-18 RX ORDER — ERGOCALCIFEROL 1.25 MG/1
50000 CAPSULE ORAL
Qty: 6 CAPSULE | Refills: 1 | Status: SHIPPED | OUTPATIENT
Start: 2021-08-18 | End: 2021-12-16 | Stop reason: SDUPTHER

## 2021-08-18 RX ORDER — ALPRAZOLAM 0.5 MG/1
0.5 TABLET ORAL 3 TIMES DAILY PRN
Qty: 75 TABLET | Refills: 0 | Status: SHIPPED | OUTPATIENT
Start: 2021-08-22 | End: 2021-09-21

## 2021-08-18 RX ORDER — ATORVASTATIN CALCIUM 20 MG/1
20 TABLET, FILM COATED ORAL DAILY
Qty: 30 TABLET | Refills: 5 | Status: SHIPPED | OUTPATIENT
Start: 2021-08-18 | End: 2022-06-30 | Stop reason: SDUPTHER

## 2021-08-18 ASSESSMENT — ENCOUNTER SYMPTOMS
CONSTIPATION: 0
WEIGHT LOSS: 0
PALPITATIONS: 0
CHILLS: 0
SHORTNESS OF BREATH: 0
COUGH: 0
DIARRHEA: 0
BLOOD IN STOOL: 0
ABDOMINAL PAIN: 0
WHEEZING: 0
FEVER: 0
NERVOUS/ANXIOUS: 1

## 2021-08-18 NOTE — PROGRESS NOTES
Chief Complaint   Patient presents with   • Medication Refill       Subjective:     HPI:   Sammy Romo is a 61 y.o. male here to discuss the evaluation and management of:        Anxiety  Patient reports that he has been feeling slightly better with his anxiety in the past couple weeks.  July is always his hardest time as it is the anniversary of his son's death.  He denies side effects from the medication.    Erectile dysfunction  Patient reports improvement since starting the testosterone injections.      ROS  Review of Systems   Constitutional: Negative for chills, fever, malaise/fatigue and weight loss.   Respiratory: Negative for cough, shortness of breath and wheezing.    Cardiovascular: Negative for chest pain, palpitations and leg swelling.   Gastrointestinal: Negative for abdominal pain, blood in stool, constipation and diarrhea.   Psychiatric/Behavioral: The patient is nervous/anxious.          No Known Allergies    Current medicines (including changes today)  Current Outpatient Medications   Medication Sig Dispense Refill   • [START ON 8/22/2021] ALPRAZolam (XANAX) 0.5 MG Tab Take 1 Tablet by mouth 3 times a day as needed for Anxiety for up to 30 days. 75 Tablet 0   • [START ON 9/21/2021] ALPRAZolam (XANAX) 0.5 MG Tab Take 1 Tablet by mouth 3 times a day as needed for Anxiety for up to 30 days. DNF 9/21/21 75 Tablet 0   • [START ON 10/21/2021] ALPRAZolam (XANAX) 0.5 MG Tab Take 1 Tablet by mouth 3 times a day as needed for Anxiety for up to 30 days. DNF 10/21/21 75 Tablet 0   • vitamin D, Ergocalciferol, (DRISDOL) 1.25 MG (34708 UT) Cap capsule Take 1 Capsule by mouth every 14 days. 6 Capsule 1   • gabapentin (NEURONTIN) 300 MG Cap Take 1 capsule by mouth every bedtime. 30 Capsule 5   • atorvastatin (LIPITOR) 20 MG Tab Take 1 Tablet by mouth every day. 30 Tablet 5   • lisinopril (PRINIVIL) 30 MG tablet Take 1 Tab by mouth every day. For high blood pressure 90 Tab 3   • cyclobenzaprine (FLEXERIL) 10 mg Tab  Take 1 Tab by mouth 3 times a day as needed. 90 Tab 3     Current Facility-Administered Medications   Medication Dose Route Frequency Provider Last Rate Last Admin   • testosterone cypionate (DEPO-TESTOSTERONE) injection 100 mg  100 mg Intramuscular Q14 DAYS PREMA Peters   100 mg at 08/10/21 1138       Social History     Tobacco Use   • Smoking status: Never Smoker   • Smokeless tobacco: Never Used   Vaping Use   • Vaping Use: Never used   Substance Use Topics   • Alcohol use: Yes     Comment: socially, moderate   • Drug use: No       Patient Active Problem List    Diagnosis Date Noted   • Low testosterone 06/15/2021   • Erectile dysfunction 05/19/2021   • Vitamin D deficiency 06/11/2020   • Post-traumatic stress disorder, unspecified 05/04/2020   • Anxiety 12/05/2019   • Impacted cerumen of right ear 12/05/2019   • Major depressive disorder 11/06/2019   • Encounter to establish care 09/04/2019   • Essential hypertension 09/04/2019   • Chronic midline low back pain with right-sided sciatica 09/04/2019   • Obesity (BMI 30-39.9) 09/04/2019       Family History   Problem Relation Age of Onset   • Hypertension Maternal Grandfather    • Heart Disease Maternal Grandfather           Objective:     Resp 16   Ht 1.829 m (6')   Wt 102 kg (225 lb 6.4 oz)  Body mass index is 30.57 kg/m².    Physical Exam:  Physical Exam  Constitutional:       General: He is not in acute distress.  HENT:      Head: Normocephalic.      Right Ear: Tympanic membrane and external ear normal.      Left Ear: Tympanic membrane and external ear normal.   Eyes:      Conjunctiva/sclera: Conjunctivae normal.      Pupils: Pupils are equal, round, and reactive to light.   Neck:      Trachea: No tracheal deviation.   Cardiovascular:      Rate and Rhythm: Normal rate and regular rhythm.      Heart sounds: Normal heart sounds.   Pulmonary:      Effort: Pulmonary effort is normal.      Breath sounds: Normal breath sounds.   Abdominal:       General: Bowel sounds are normal.      Palpations: Abdomen is soft.   Musculoskeletal:         General: Normal range of motion.      Cervical back: Normal range of motion and neck supple.   Lymphadenopathy:      Head:      Right side of head: No preauricular adenopathy.      Left side of head: No preauricular adenopathy.      Cervical: No cervical adenopathy.   Skin:     General: Skin is warm and dry.   Neurological:      Mental Status: He is alert and oriented to person, place, and time.      Cranial Nerves: Cranial nerves are intact.      Sensory: Sensation is intact.      Gait: Gait is intact.   Psychiatric:         Mood and Affect: Affect normal.         Judgment: Judgment normal.         Assessment and Plan:     The following treatment plan was discussed:    1. Low testosterone  CBC WITHOUT DIFFERENTIAL    Testosterone, Free & Total, Adult Male (w/SHBG)    PROSTATE SPECIFIC AG SCREENING  -Chronic problem, improving.  As of next week he will have had 3 months of treatment therefore we will check his labs as above.  Patient was instructed to go between 8 and 10 AM-patient verbalized understanding.   2. Erectile dysfunction, unspecified erectile dysfunction type  -see above   3. Anxiety  ALPRAZolam (XANAX) 0.5 MG Tab    ALPRAZolam (XANAX) 0.5 MG Tab    ALPRAZolam (XANAX) 0.5 MG Tab  -Chronic problem, stable.  Medication refill.   4. Elevated hemoglobin A1c  HEMOGLOBIN A1C  -Chronic problem, unclear stability.  Patient has improved his diet.   5. Vitamin D deficiency  vitamin D, Ergocalciferol, (DRISDOL) 1.25 MG (19299 UT) Cap capsule  -Chronic problem, stable.  Medication refill.   6. Chronic midline low back pain with right-sided sciatica  gabapentin (NEURONTIN) 300 MG Cap  -Chronic problem, stable.  Medication refill.   7. Hyperlipidemia, unspecified hyperlipidemia type  atorvastatin (LIPITOR) 20 MG Tab  -Chronic problem, stable.  Medication refill.       Any change or worsening of signs or symptoms, patient  encouraged to follow-up or report to emergency room for further evaluation. Patient verbalizes understanding and agrees.    Follow-Up: Return in about 1 week (around 8/25/2021) for For testosterone injection.      PLEASE NOTE: This dictation was created using voice recognition software. I have made every reasonable attempt to correct obvious errors, but I expect that there are errors of grammar and possibly content that I did not discover before finalizing the note.

## 2021-08-18 NOTE — ASSESSMENT & PLAN NOTE
Patient reports that he has been feeling slightly better with his anxiety in the past couple weeks.  July is always his hardest time as it is the anniversary of his son's death.  He denies side effects from the medication.

## 2021-08-19 ENCOUNTER — PHARMACY VISIT (OUTPATIENT)
Dept: PHARMACY | Facility: MEDICAL CENTER | Age: 61
End: 2021-08-19
Payer: COMMERCIAL

## 2021-08-30 ENCOUNTER — HOSPITAL ENCOUNTER (OUTPATIENT)
Dept: LAB | Facility: MEDICAL CENTER | Age: 61
End: 2021-08-30
Attending: NURSE PRACTITIONER
Payer: MEDICAID

## 2021-08-30 DIAGNOSIS — R79.89 LOW TESTOSTERONE: ICD-10-CM

## 2021-08-30 DIAGNOSIS — R73.09 ELEVATED HEMOGLOBIN A1C: ICD-10-CM

## 2021-08-30 LAB
ERYTHROCYTE [DISTWIDTH] IN BLOOD BY AUTOMATED COUNT: 44.2 FL (ref 35.9–50)
EST. AVERAGE GLUCOSE BLD GHB EST-MCNC: 114 MG/DL
HBA1C MFR BLD: 5.6 % (ref 4–5.6)
HCT VFR BLD AUTO: 43.8 % (ref 42–52)
HGB BLD-MCNC: 13.9 G/DL (ref 14–18)
MCH RBC QN AUTO: 28.4 PG (ref 27–33)
MCHC RBC AUTO-ENTMCNC: 31.7 G/DL (ref 33.7–35.3)
MCV RBC AUTO: 89.6 FL (ref 81.4–97.8)
PLATELET # BLD AUTO: 203 K/UL (ref 164–446)
PMV BLD AUTO: 12.3 FL (ref 9–12.9)
PSA SERPL-MCNC: 0.94 NG/ML (ref 0–4)
RBC # BLD AUTO: 4.89 M/UL (ref 4.7–6.1)
WBC # BLD AUTO: 5.2 K/UL (ref 4.8–10.8)

## 2021-08-30 PROCEDURE — 84402 ASSAY OF FREE TESTOSTERONE: CPT

## 2021-08-30 PROCEDURE — 84270 ASSAY OF SEX HORMONE GLOBUL: CPT

## 2021-08-30 PROCEDURE — 84153 ASSAY OF PSA TOTAL: CPT

## 2021-08-30 PROCEDURE — 83036 HEMOGLOBIN GLYCOSYLATED A1C: CPT

## 2021-08-30 PROCEDURE — 36415 COLL VENOUS BLD VENIPUNCTURE: CPT

## 2021-08-30 PROCEDURE — 85027 COMPLETE CBC AUTOMATED: CPT

## 2021-08-30 PROCEDURE — 84403 ASSAY OF TOTAL TESTOSTERONE: CPT

## 2021-08-31 ENCOUNTER — NON-PROVIDER VISIT (OUTPATIENT)
Dept: MEDICAL GROUP | Facility: MEDICAL CENTER | Age: 61
End: 2021-08-31
Attending: PHYSICIAN ASSISTANT
Payer: MEDICAID

## 2021-08-31 PROCEDURE — 700111 HCHG RX REV CODE 636 W/ 250 OVERRIDE (IP): Performed by: NURSE PRACTITIONER

## 2021-08-31 PROCEDURE — 96372 THER/PROPH/DIAG INJ SC/IM: CPT | Performed by: PHYSICIAN ASSISTANT

## 2021-08-31 PROCEDURE — 99999 PR NO CHARGE: CPT | Performed by: PHYSICIAN ASSISTANT

## 2021-08-31 RX ADMIN — TESTOSTERONE CYPIONATE 100 MG: 200 INJECTION, SOLUTION INTRAMUSCULAR at 11:23

## 2021-08-31 NOTE — NON-PROVIDER
Sammy Romo is a 61 y.o. male here for a non-provider visit for testosteron  injection.    Reason for injection: ordered by pcp  Order in MAR?: Yes  Patient supplied?:No  Minimum interval has been met for this injection (per MAR order): Yes    Patient tolerated injection and no adverse effects were observed or reported: Yes    # of Administrations remaining in MAR: 0

## 2021-09-01 LAB
SHBG SERPL-SCNC: 35 NMOL/L (ref 11–80)
TESTOST FREE MFR SERPL: 1.7 % (ref 1.6–2.9)
TESTOST FREE SERPL-MCNC: 36 PG/ML (ref 47–244)
TESTOST SERPL-MCNC: 210 NG/DL (ref 300–720)

## 2021-09-02 NOTE — RESULT ENCOUNTER NOTE
Please call pt and let them know that I got his lab work.  His testosterone level is better, but still not quite at the normal level.  We will continue every other week testosterone injections and recheck his labs again in 3 months.  He is no longer prediabetic, great job!

## 2021-09-17 ENCOUNTER — PHARMACY VISIT (OUTPATIENT)
Dept: PHARMACY | Facility: MEDICAL CENTER | Age: 61
End: 2021-09-17
Payer: COMMERCIAL

## 2021-09-17 PROCEDURE — RXMED WILLOW AMBULATORY MEDICATION CHARGE: Performed by: NURSE PRACTITIONER

## 2021-09-21 ENCOUNTER — NON-PROVIDER VISIT (OUTPATIENT)
Dept: MEDICAL GROUP | Facility: MEDICAL CENTER | Age: 61
End: 2021-09-21
Attending: NURSE PRACTITIONER
Payer: MEDICAID

## 2021-09-21 DIAGNOSIS — R79.89 LOW TESTOSTERONE: ICD-10-CM

## 2021-09-21 PROCEDURE — 96372 THER/PROPH/DIAG INJ SC/IM: CPT | Performed by: NURSE PRACTITIONER

## 2021-09-21 PROCEDURE — 700111 HCHG RX REV CODE 636 W/ 250 OVERRIDE (IP): Performed by: NURSE PRACTITIONER

## 2021-09-21 RX ORDER — TESTOSTERONE CYPIONATE 200 MG/ML
100 INJECTION, SOLUTION INTRAMUSCULAR ONCE
Status: COMPLETED | OUTPATIENT
Start: 2021-09-21 | End: 2021-09-21

## 2021-09-21 RX ADMIN — TESTOSTERONE CYPIONATE 100 MG: 200 INJECTION, SOLUTION INTRAMUSCULAR at 10:47

## 2021-09-21 NOTE — NON-PROVIDER
Sammy Romo is a 61 y.o. male here for a non-provider visit for Testosteron injection.    Reason for injection: Low Testosterone  Order in MAR?: Yes  Patient supplied?:No  Minimum interval has been met for this injection (per MAR order): Yes    Patient tolerated injection and no adverse effects were observed or reported: Yes    # of Administrations remaining in MAR: 0

## 2021-10-06 ENCOUNTER — NON-PROVIDER VISIT (OUTPATIENT)
Dept: MEDICAL GROUP | Facility: MEDICAL CENTER | Age: 61
End: 2021-10-06
Attending: INTERNAL MEDICINE
Payer: MEDICAID

## 2021-10-06 DIAGNOSIS — R79.89 LOW TESTOSTERONE: ICD-10-CM

## 2021-10-06 PROCEDURE — 99999 PR NO CHARGE: CPT | Performed by: PHYSICIAN ASSISTANT

## 2021-10-06 PROCEDURE — 96372 THER/PROPH/DIAG INJ SC/IM: CPT | Performed by: INTERNAL MEDICINE

## 2021-10-06 PROCEDURE — 700111 HCHG RX REV CODE 636 W/ 250 OVERRIDE (IP): Performed by: PHYSICIAN ASSISTANT

## 2021-10-06 RX ORDER — TESTOSTERONE CYPIONATE 200 MG/ML
100 INJECTION, SOLUTION INTRAMUSCULAR ONCE
Qty: 0.5 ML | Refills: 0 | Status: SHIPPED | OUTPATIENT
Start: 2021-10-06 | End: 2021-10-06

## 2021-10-06 RX ORDER — TESTOSTERONE CYPIONATE 200 MG/ML
100 INJECTION, SOLUTION INTRAMUSCULAR ONCE
Status: COMPLETED | OUTPATIENT
Start: 2021-10-06 | End: 2021-10-06

## 2021-10-06 RX ADMIN — TESTOSTERONE CYPIONATE 100 MG: 200 INJECTION, SOLUTION INTRAMUSCULAR at 11:14

## 2021-10-06 NOTE — PROGRESS NOTES
Sammy Romo is a 61 y.o. male here for a non-provider visit for Tesosterone injection.    Reason for injection: Low testosterone levels  Order in MAR?: Yes  Patient supplied?:No  Minimum interval has been met for this injection (per MAR order): Yes    Patient tolerated injection and no adverse effects were observed or reported: Yes    # of Administrations remaining in MAR: 0

## 2021-10-14 ENCOUNTER — PHARMACY VISIT (OUTPATIENT)
Dept: PHARMACY | Facility: MEDICAL CENTER | Age: 61
End: 2021-10-14
Payer: COMMERCIAL

## 2021-10-14 DIAGNOSIS — I10 ESSENTIAL HYPERTENSION: ICD-10-CM

## 2021-10-14 PROCEDURE — RXMED WILLOW AMBULATORY MEDICATION CHARGE: Performed by: NURSE PRACTITIONER

## 2021-10-14 RX ORDER — LISINOPRIL 30 MG/1
30 TABLET ORAL DAILY
Qty: 90 TABLET | Refills: 3 | Status: SHIPPED | OUTPATIENT
Start: 2021-10-14 | End: 2022-07-07 | Stop reason: SDUPTHER

## 2021-10-26 ENCOUNTER — NON-PROVIDER VISIT (OUTPATIENT)
Dept: MEDICAL GROUP | Facility: MEDICAL CENTER | Age: 61
End: 2021-10-26
Attending: PHYSICIAN ASSISTANT
Payer: MEDICAID

## 2021-10-26 DIAGNOSIS — R79.89 LOW TESTOSTERONE: ICD-10-CM

## 2021-10-26 PROCEDURE — 96372 THER/PROPH/DIAG INJ SC/IM: CPT | Performed by: PHYSICIAN ASSISTANT

## 2021-10-26 PROCEDURE — 700111 HCHG RX REV CODE 636 W/ 250 OVERRIDE (IP): Performed by: PHYSICIAN ASSISTANT

## 2021-10-26 RX ORDER — TESTOSTERONE CYPIONATE 200 MG/ML
100 INJECTION, SOLUTION INTRAMUSCULAR ONCE
Status: COMPLETED | OUTPATIENT
Start: 2021-10-26 | End: 2021-10-26

## 2021-10-26 RX ADMIN — TESTOSTERONE CYPIONATE 100 MG: 200 INJECTION, SOLUTION INTRAMUSCULAR at 11:39

## 2021-10-26 NOTE — NON-PROVIDER
Sammy Romo is a 61 y.o. male here for a non-provider visit for Testosterone injection.    Reason for injection: Low Testosterone  Order in MAR?: Yes  Patient supplied?:No  Minimum interval has been met for this injection (per MAR order): Yes    Patient tolerated injection and no adverse effects were observed or reported: Yes    # of Administrations remaining in MAR: 0

## 2021-11-09 ENCOUNTER — NON-PROVIDER VISIT (OUTPATIENT)
Dept: MEDICAL GROUP | Facility: MEDICAL CENTER | Age: 61
End: 2021-11-09
Attending: FAMILY MEDICINE
Payer: MEDICAID

## 2021-11-09 DIAGNOSIS — R79.89 LOW TESTOSTERONE: Primary | ICD-10-CM

## 2021-11-09 PROCEDURE — 700111 HCHG RX REV CODE 636 W/ 250 OVERRIDE (IP): Performed by: NURSE PRACTITIONER

## 2021-11-09 PROCEDURE — 96372 THER/PROPH/DIAG INJ SC/IM: CPT | Performed by: NURSE PRACTITIONER

## 2021-11-09 RX ORDER — TESTOSTERONE CYPIONATE 200 MG/ML
100 INJECTION, SOLUTION INTRAMUSCULAR ONCE
Status: COMPLETED | OUTPATIENT
Start: 2021-11-09 | End: 2021-11-09

## 2021-11-09 RX ADMIN — TESTOSTERONE CYPIONATE 100 MG: 200 INJECTION, SOLUTION INTRAMUSCULAR at 11:39

## 2021-11-11 DIAGNOSIS — F41.9 ANXIETY: ICD-10-CM

## 2021-11-11 PROCEDURE — RXMED WILLOW AMBULATORY MEDICATION CHARGE: Performed by: NURSE PRACTITIONER

## 2021-11-11 RX ORDER — ALPRAZOLAM 0.5 MG/1
0.5 TABLET ORAL 3 TIMES DAILY PRN
Qty: 75 TABLET | Refills: 0 | OUTPATIENT
Start: 2021-11-11 | End: 2021-12-11

## 2021-11-12 ENCOUNTER — PHARMACY VISIT (OUTPATIENT)
Dept: PHARMACY | Facility: MEDICAL CENTER | Age: 61
End: 2021-11-12
Payer: COMMERCIAL

## 2021-11-12 DIAGNOSIS — F41.9 ANXIETY: ICD-10-CM

## 2021-11-12 RX ORDER — ALPRAZOLAM 0.5 MG/1
0.5 TABLET ORAL 3 TIMES DAILY PRN
Qty: 75 TABLET | Refills: 0 | OUTPATIENT
Start: 2021-11-12 | End: 2021-12-12

## 2021-11-16 DIAGNOSIS — F41.9 ANXIETY: ICD-10-CM

## 2021-11-16 NOTE — TELEPHONE ENCOUNTER
Received request via: Pharmacy    Was the patient seen in the last year in this department? Yes    Does the patient have an active prescription (recently filled or refills available) for medication(s) requested? No     Future Appointments       Provider Department [place]    11/23/2021 1:00 PM PREMA [facility]

## 2021-11-17 ENCOUNTER — TELEPHONE (OUTPATIENT)
Dept: MEDICAL GROUP | Facility: MEDICAL CENTER | Age: 61
End: 2021-11-17

## 2021-11-17 NOTE — TELEPHONE ENCOUNTER
VOICEMAIL  1. Caller Name: MONIKA SHRESTHA                      Call Back Number: [phone] (home)     2. Message: Pt. LVM stating that he needed a refill on his Xanax. Chart review shows that PCP need patient to come in for an appt for controlled substance.    3. Patient approves office to leave a detailed voicemail/MyChart message: N\A    Phone Number Called: [phone] (home)     Call outcome: Spoke to patient regarding message below.    Message: Notified patient that he needed an appt for his med refill and his provider was okay with double booking him. Scheduled patient.

## 2021-11-17 NOTE — TELEPHONE ENCOUNTER
VOICEMAIL  1. Caller Name: MONIKA SHRESTHA                      Call Back Number: [phone] (home)     2. Message: Pt. LVM requesting that status of his refill for his Xanax. Please advise.     3. Patient approves office to leave a detailed voicemail/MyChart message: N\A

## 2021-11-18 ENCOUNTER — PHARMACY VISIT (OUTPATIENT)
Dept: PHARMACY | Facility: MEDICAL CENTER | Age: 61
End: 2021-11-18
Payer: COMMERCIAL

## 2021-11-18 ENCOUNTER — OFFICE VISIT (OUTPATIENT)
Dept: MEDICAL GROUP | Facility: MEDICAL CENTER | Age: 61
End: 2021-11-18
Attending: NURSE PRACTITIONER
Payer: MEDICAID

## 2021-11-18 VITALS
BODY MASS INDEX: 31.09 KG/M2 | WEIGHT: 229.2 LBS | HEART RATE: 74 BPM | SYSTOLIC BLOOD PRESSURE: 160 MMHG | TEMPERATURE: 98.1 F | DIASTOLIC BLOOD PRESSURE: 92 MMHG

## 2021-11-18 DIAGNOSIS — F41.9 ANXIETY: ICD-10-CM

## 2021-11-18 PROCEDURE — 99213 OFFICE O/P EST LOW 20 MIN: CPT | Performed by: NURSE PRACTITIONER

## 2021-11-18 PROCEDURE — RXMED WILLOW AMBULATORY MEDICATION CHARGE: Performed by: NURSE PRACTITIONER

## 2021-11-18 RX ORDER — ALPRAZOLAM 0.5 MG/1
0.5 TABLET ORAL 3 TIMES DAILY PRN
Qty: 75 TABLET | Refills: 0 | Status: SHIPPED | OUTPATIENT
Start: 2021-12-18 | End: 2022-01-17

## 2021-11-18 RX ORDER — ALPRAZOLAM 0.5 MG/1
0.5 TABLET ORAL 3 TIMES DAILY PRN
Qty: 75 TABLET | Refills: 0 | Status: SHIPPED | OUTPATIENT
Start: 2022-01-17 | End: 2022-02-16

## 2021-11-18 RX ORDER — ALPRAZOLAM 0.5 MG/1
0.5 TABLET ORAL 3 TIMES DAILY PRN
Qty: 75 TABLET | Refills: 0 | Status: SHIPPED | OUTPATIENT
Start: 2021-11-18 | End: 2021-12-18

## 2021-11-18 RX ORDER — ALPRAZOLAM 0.5 MG/1
0.5 TABLET ORAL 3 TIMES DAILY PRN
Qty: 75 TABLET | Refills: 0 | OUTPATIENT
Start: 2021-11-18 | End: 2021-12-18

## 2021-11-18 ASSESSMENT — ENCOUNTER SYMPTOMS
BLOOD IN STOOL: 0
PALPITATIONS: 0
COUGH: 0
DIARRHEA: 0
WEIGHT LOSS: 0
ABDOMINAL PAIN: 0
NERVOUS/ANXIOUS: 1
CONSTIPATION: 0
CHILLS: 0
FEVER: 0
WHEEZING: 0
SHORTNESS OF BREATH: 0

## 2021-11-18 NOTE — PROGRESS NOTES
No chief complaint on file.      Subjective:     HPI:   Sammy Romo is a 61 y.o. male here to discuss the evaluation and management of:        Anxiety  He continues to benefit from his xanax without side effects.        ROS  Review of Systems   Constitutional: Negative for chills, fever, malaise/fatigue and weight loss.   Respiratory: Negative for cough, shortness of breath and wheezing.    Cardiovascular: Negative for chest pain, palpitations and leg swelling.   Gastrointestinal: Negative for abdominal pain, blood in stool, constipation and diarrhea.   Psychiatric/Behavioral: The patient is nervous/anxious.          No Known Allergies    Current medicines (including changes today)  Current Outpatient Medications   Medication Sig Dispense Refill   • ALPRAZolam (XANAX) 0.5 MG Tab Take 1 Tablet by mouth 3 times a day as needed for Anxiety for up to 30 days. 75 Tablet 0   • [START ON 12/18/2021] ALPRAZolam (XANAX) 0.5 MG Tab Take 1 Tablet by mouth 3 times a day as needed for Anxiety for up to 30 days. 75 Tablet 0   • [START ON 1/17/2022] ALPRAZolam (XANAX) 0.5 MG Tab Take 1 Tablet by mouth 3 times a day as needed for Anxiety for up to 30 days. 75 Tablet 0   • lisinopril (PRINIVIL) 30 MG tablet Take 1 Tablet by mouth every day for high blood pressure 90 Tablet 3   • ALPRAZolam (XANAX) 0.5 MG Tab Take 1 Tablet by mouth 3 times a day as needed for Anxiety for up to 30 days. DNF 10/21/21 75 Tablet 0   • vitamin D2, Ergocalciferol, (DRISDOL) 1.25 MG (25669 UT) Cap capsule Take 1 Capsule by mouth every 14 days. 6 Capsule 1   • gabapentin (NEURONTIN) 300 MG Cap Take 1 capsule by mouth every bedtime. 30 Capsule 5   • atorvastatin (LIPITOR) 20 MG Tab Take 1 Tablet by mouth every day. 30 Tablet 5   • cyclobenzaprine (FLEXERIL) 10 mg Tab Take 1 Tab by mouth 3 times a day as needed. 90 Tab 3     No current facility-administered medications for this visit.       Social History     Tobacco Use   • Smoking status: Never Smoker   •  Smokeless tobacco: Never Used   Vaping Use   • Vaping Use: Never used   Substance Use Topics   • Alcohol use: Yes     Comment: socially, moderate   • Drug use: No       Patient Active Problem List    Diagnosis Date Noted   • Low testosterone 06/15/2021   • Erectile dysfunction 05/19/2021   • Vitamin D deficiency 06/11/2020   • Post-traumatic stress disorder, unspecified 05/04/2020   • Anxiety 12/05/2019   • Impacted cerumen of right ear 12/05/2019   • Major depressive disorder 11/06/2019   • Encounter to establish care 09/04/2019   • Essential hypertension 09/04/2019   • Chronic midline low back pain with right-sided sciatica 09/04/2019   • Obesity (BMI 30-39.9) 09/04/2019       Family History   Problem Relation Age of Onset   • Hypertension Maternal Grandfather    • Heart Disease Maternal Grandfather           Objective:     /92 (BP Location: Left arm, Patient Position: Sitting, BP Cuff Size: Adult)   Pulse 74   Temp 36.7 °C (98.1 °F) (Temporal)   Wt 104 kg (229 lb 3.2 oz)  Body mass index is 31.09 kg/m².    Physical Exam:  Physical Exam  Constitutional:       General: He is not in acute distress.  HENT:      Head: Normocephalic.      Right Ear: Tympanic membrane and external ear normal.      Left Ear: Tympanic membrane and external ear normal.   Eyes:      Conjunctiva/sclera: Conjunctivae normal.      Pupils: Pupils are equal, round, and reactive to light.   Neck:      Trachea: No tracheal deviation.   Cardiovascular:      Rate and Rhythm: Normal rate and regular rhythm.      Heart sounds: Normal heart sounds.   Pulmonary:      Effort: Pulmonary effort is normal.      Breath sounds: Normal breath sounds.   Abdominal:      General: Bowel sounds are normal.      Palpations: Abdomen is soft.   Musculoskeletal:         General: Normal range of motion.      Cervical back: Normal range of motion and neck supple.   Lymphadenopathy:      Head:      Right side of head: No preauricular adenopathy.      Left side of  head: No preauricular adenopathy.      Cervical: No cervical adenopathy.   Skin:     General: Skin is warm and dry.   Neurological:      Mental Status: He is alert and oriented to person, place, and time.      Cranial Nerves: Cranial nerves are intact.      Sensory: Sensation is intact.      Gait: Gait is intact.   Psychiatric:         Mood and Affect: Affect normal.         Judgment: Judgment normal.         Assessment and Plan:     The following treatment plan was discussed:    1. Anxiety  ALPRAZolam (XANAX) 0.5 MG Tab    ALPRAZolam (XANAX) 0.5 MG Tab    ALPRAZolam (XANAX) 0.5 MG Tab  -Chronic problem, stable.  Medication refill.  PDMP reviewed.       Any change or worsening of signs or symptoms, patient encouraged to follow-up or report to emergency room for further evaluation. Patient verbalizes understanding and agrees.    Follow-Up: Return in about 3 months (around 2/18/2022) for Controlled substance management.      PLEASE NOTE: This dictation was created using voice recognition software. I have made every reasonable attempt to correct obvious errors, but I expect that there are errors of grammar and possibly content that I did not discover before finalizing the note.

## 2021-11-30 ENCOUNTER — NON-PROVIDER VISIT (OUTPATIENT)
Dept: MEDICAL GROUP | Facility: MEDICAL CENTER | Age: 61
End: 2021-11-30
Attending: NURSE PRACTITIONER
Payer: MEDICAID

## 2021-11-30 DIAGNOSIS — R79.89 LOW TESTOSTERONE: ICD-10-CM

## 2021-11-30 PROCEDURE — 700111 HCHG RX REV CODE 636 W/ 250 OVERRIDE (IP): Performed by: NURSE PRACTITIONER

## 2021-11-30 PROCEDURE — 96372 THER/PROPH/DIAG INJ SC/IM: CPT | Performed by: NURSE PRACTITIONER

## 2021-11-30 PROCEDURE — 99999 PR NO CHARGE: CPT | Performed by: NURSE PRACTITIONER

## 2021-11-30 RX ORDER — TESTOSTERONE CYPIONATE 200 MG/ML
100 INJECTION, SOLUTION INTRAMUSCULAR ONCE
Status: COMPLETED | OUTPATIENT
Start: 2021-11-30 | End: 2021-11-30

## 2021-11-30 RX ADMIN — TESTOSTERONE CYPIONATE 100 MG: 200 INJECTION, SOLUTION INTRAMUSCULAR at 12:06

## 2021-11-30 NOTE — PROGRESS NOTES
Sammy Romo is a 61 y.o. male here for a non-provider visit for testosterone injection.    Reason for injection: Low testosterone levels  Order in MAR?: Yes  Patient supplied?:No  Minimum interval has been met for this injection (per MAR order): Yes    Patient tolerated injection and no adverse effects were observed or reported: Yes    # of Administrations remaining in MAR: 0

## 2021-12-16 ENCOUNTER — PHARMACY VISIT (OUTPATIENT)
Dept: PHARMACY | Facility: MEDICAL CENTER | Age: 61
End: 2021-12-16
Payer: COMMERCIAL

## 2021-12-16 DIAGNOSIS — E55.9 VITAMIN D DEFICIENCY: ICD-10-CM

## 2021-12-16 PROCEDURE — RXMED WILLOW AMBULATORY MEDICATION CHARGE: Performed by: NURSE PRACTITIONER

## 2021-12-16 RX ORDER — ERGOCALCIFEROL 1.25 MG/1
50000 CAPSULE ORAL
Qty: 6 CAPSULE | Refills: 1 | Status: SHIPPED | OUTPATIENT
Start: 2021-12-16 | End: 2022-07-07 | Stop reason: SDUPTHER

## 2021-12-29 ENCOUNTER — NON-PROVIDER VISIT (OUTPATIENT)
Dept: MEDICAL GROUP | Facility: MEDICAL CENTER | Age: 61
End: 2021-12-29
Attending: INTERNAL MEDICINE
Payer: MEDICAID

## 2021-12-29 DIAGNOSIS — R79.89 LOW TESTOSTERONE: ICD-10-CM

## 2021-12-29 PROCEDURE — 96372 THER/PROPH/DIAG INJ SC/IM: CPT | Performed by: INTERNAL MEDICINE

## 2021-12-29 PROCEDURE — 700111 HCHG RX REV CODE 636 W/ 250 OVERRIDE (IP): Performed by: INTERNAL MEDICINE

## 2021-12-29 RX ORDER — TESTOSTERONE CYPIONATE 200 MG/ML
100 INJECTION, SOLUTION INTRAMUSCULAR ONCE
Status: COMPLETED | OUTPATIENT
Start: 2021-12-29 | End: 2021-12-29

## 2021-12-29 RX ADMIN — TESTOSTERONE CYPIONATE 100 MG: 200 INJECTION, SOLUTION INTRAMUSCULAR at 10:54

## 2021-12-29 NOTE — NON-PROVIDER
Sammy Romo is a 61 y.o. male here for a non-provider visit for Testosterone injection.    Reason for injection: Low testosterone levels  Order in MAR?: Yes  Patient supplied?:No  Minimum interval has been met for this injection (per MAR order): Yes    Patient tolerated injection and no adverse effects were observed or reported: Yes    # of Administrations remaining in MAR: 0     Patient tolerated injection and did not have any follow up questions.

## 2022-01-13 ENCOUNTER — PHARMACY VISIT (OUTPATIENT)
Dept: PHARMACY | Facility: MEDICAL CENTER | Age: 62
End: 2022-01-13
Payer: COMMERCIAL

## 2022-01-13 PROCEDURE — RXMED WILLOW AMBULATORY MEDICATION CHARGE: Performed by: NURSE PRACTITIONER

## 2022-01-18 ENCOUNTER — NON-PROVIDER VISIT (OUTPATIENT)
Dept: MEDICAL GROUP | Facility: MEDICAL CENTER | Age: 62
End: 2022-01-18
Attending: NURSE PRACTITIONER
Payer: MEDICAID

## 2022-01-18 DIAGNOSIS — Z13.0 SCREENING FOR DEFICIENCY ANEMIA: ICD-10-CM

## 2022-01-18 DIAGNOSIS — R79.89 LOW TESTOSTERONE: ICD-10-CM

## 2022-01-18 PROCEDURE — 96372 THER/PROPH/DIAG INJ SC/IM: CPT | Performed by: NURSE PRACTITIONER

## 2022-01-18 PROCEDURE — 700111 HCHG RX REV CODE 636 W/ 250 OVERRIDE (IP): Performed by: NURSE PRACTITIONER

## 2022-01-18 RX ORDER — TESTOSTERONE CYPIONATE 200 MG/ML
100 INJECTION, SOLUTION INTRAMUSCULAR ONCE
Status: COMPLETED | OUTPATIENT
Start: 2022-01-18 | End: 2022-01-18

## 2022-01-18 RX ADMIN — TESTOSTERONE CYPIONATE 100 MG: 200 INJECTION, SOLUTION INTRAMUSCULAR at 10:11

## 2022-01-18 NOTE — NON-PROVIDER
Sammy Romo is a 61 y.o. male here for a non-provider visit for testosterone injection.    Reason for injection: provider ordered   Order in MAR?: Yes  Patient supplied?:No  Minimum interval has been met for this injection (per MAR order): Yes    Patient tolerated injection and no adverse effects were observed or reported: Yes    # of Administrations remaining in MAR: 0

## 2022-01-24 ENCOUNTER — HOSPITAL ENCOUNTER (OUTPATIENT)
Dept: LAB | Facility: MEDICAL CENTER | Age: 62
End: 2022-01-24
Attending: NURSE PRACTITIONER
Payer: MEDICAID

## 2022-01-24 DIAGNOSIS — Z13.0 SCREENING FOR DEFICIENCY ANEMIA: ICD-10-CM

## 2022-01-24 DIAGNOSIS — E34.9 TESTOSTERONE DEFICIENCY: ICD-10-CM

## 2022-01-24 LAB
ERYTHROCYTE [DISTWIDTH] IN BLOOD BY AUTOMATED COUNT: 45.1 FL (ref 35.9–50)
HCT VFR BLD AUTO: 44.8 % (ref 42–52)
HGB BLD-MCNC: 15 G/DL (ref 14–18)
MCH RBC QN AUTO: 30.2 PG (ref 27–33)
MCHC RBC AUTO-ENTMCNC: 33.5 G/DL (ref 33.7–35.3)
MCV RBC AUTO: 90.3 FL (ref 81.4–97.8)
PLATELET # BLD AUTO: 183 K/UL (ref 164–446)
PMV BLD AUTO: 11.6 FL (ref 9–12.9)
RBC # BLD AUTO: 4.96 M/UL (ref 4.7–6.1)
WBC # BLD AUTO: 4.8 K/UL (ref 4.8–10.8)

## 2022-01-24 PROCEDURE — 85027 COMPLETE CBC AUTOMATED: CPT

## 2022-01-24 PROCEDURE — 36415 COLL VENOUS BLD VENIPUNCTURE: CPT

## 2022-01-24 RX ORDER — TESTOSTERONE CYPIONATE 200 MG/ML
100 INJECTION, SOLUTION INTRAMUSCULAR
Status: SHIPPED | OUTPATIENT
Start: 2022-01-24 | End: 2022-04-18

## 2022-01-24 NOTE — PROGRESS NOTES
CBC with differential was normal.  3-month supply of every 14-day 100 mg Depo testosterone injection in office ordered.

## 2022-01-25 ENCOUNTER — OFFICE VISIT (OUTPATIENT)
Dept: MEDICAL GROUP | Facility: MEDICAL CENTER | Age: 62
End: 2022-01-25
Attending: NURSE PRACTITIONER
Payer: MEDICAID

## 2022-01-25 VITALS
TEMPERATURE: 97.6 F | WEIGHT: 235 LBS | SYSTOLIC BLOOD PRESSURE: 132 MMHG | DIASTOLIC BLOOD PRESSURE: 88 MMHG | OXYGEN SATURATION: 95 % | HEART RATE: 76 BPM | RESPIRATION RATE: 12 BRPM | BODY MASS INDEX: 31.83 KG/M2 | HEIGHT: 72 IN

## 2022-01-25 DIAGNOSIS — H61.23 BILATERAL IMPACTED CERUMEN: ICD-10-CM

## 2022-01-25 DIAGNOSIS — F41.9 ANXIETY: ICD-10-CM

## 2022-01-25 PROBLEM — H61.21 IMPACTED CERUMEN OF RIGHT EAR: Status: RESOLVED | Noted: 2019-12-05 | Resolved: 2022-01-25

## 2022-01-25 PROCEDURE — 99213 OFFICE O/P EST LOW 20 MIN: CPT | Performed by: NURSE PRACTITIONER

## 2022-01-25 PROCEDURE — 99214 OFFICE O/P EST MOD 30 MIN: CPT | Performed by: NURSE PRACTITIONER

## 2022-01-25 RX ORDER — ALPRAZOLAM 0.5 MG/1
0.5 TABLET ORAL 3 TIMES DAILY PRN
Qty: 75 TABLET | Refills: 0 | Status: SHIPPED | OUTPATIENT
Start: 2022-04-14 | End: 2022-05-14

## 2022-01-25 RX ORDER — TESTOSTERONE CYPIONATE 200 MG/ML
100 INJECTION, SOLUTION INTRAMUSCULAR ONCE
Status: CANCELLED | OUTPATIENT
Start: 2022-01-25 | End: 2022-01-25

## 2022-01-25 RX ORDER — ALPRAZOLAM 0.5 MG/1
0.5 TABLET ORAL 3 TIMES DAILY PRN
Qty: 75 TABLET | Refills: 0 | Status: SHIPPED | OUTPATIENT
Start: 2022-02-13 | End: 2022-03-30

## 2022-01-25 RX ORDER — ALPRAZOLAM 0.5 MG/1
0.5 TABLET ORAL 3 TIMES DAILY PRN
Qty: 75 TABLET | Refills: 0 | Status: SHIPPED | OUTPATIENT
Start: 2022-03-15 | End: 2022-05-20

## 2022-01-25 ASSESSMENT — ENCOUNTER SYMPTOMS
ABDOMINAL PAIN: 0
PALPITATIONS: 0
BLOOD IN STOOL: 0
FEVER: 0
DIARRHEA: 0
WHEEZING: 0
WEIGHT LOSS: 0
CHILLS: 0
SHORTNESS OF BREATH: 0
CONSTIPATION: 0
COUGH: 0
NERVOUS/ANXIOUS: 1

## 2022-01-25 NOTE — ASSESSMENT & PLAN NOTE
Pt reports that his ears feel full of wax again.  He uses bees wax in his hair and is concerned that this is contributing to his build up.  He denies hearing changes.

## 2022-01-25 NOTE — PROGRESS NOTES
Chief Complaint   Patient presents with   • Medication Management   • Ear Fullness   • Sleep Problem       Subjective:     HPI:   Sammy Romo is a 61 y.o. male here to discuss the evaluation and management of:        Bilateral impacted cerumen  Pt reports that his ears feel full of wax again.  He uses bees wax in his hair and is concerned that this is contributing to his build up.  He denies hearing changes.    Anxiety  He continues to benefit from his xanax without side effects.        ROS  Review of Systems   Constitutional: Negative for chills, fever, malaise/fatigue and weight loss.   Respiratory: Negative for cough, shortness of breath and wheezing.    Cardiovascular: Negative for chest pain, palpitations and leg swelling.   Gastrointestinal: Negative for abdominal pain, blood in stool, constipation and diarrhea.   Psychiatric/Behavioral: The patient is nervous/anxious.          No Known Allergies    Current medicines (including changes today)  Current Outpatient Medications   Medication Sig Dispense Refill   • [START ON 2/13/2022] ALPRAZolam (XANAX) 0.5 MG Tab Take 1 Tablet by mouth 3 times a day as needed for Anxiety for up to 30 days. FILL 2/13/22 75 Tablet 0   • [START ON 3/15/2022] ALPRAZolam (XANAX) 0.5 MG Tab Take 1 Tablet by mouth 3 times a day as needed for Anxiety for up to 30 days. FILL 3/15/22 75 Tablet 0   • [START ON 4/14/2022] ALPRAZolam (XANAX) 0.5 MG Tab Take 1 Tablet by mouth 3 times a day as needed for Anxiety for up to 30 days. 75 Tablet 0   • vitamin D2, Ergocalciferol, (DRISDOL) 1.25 MG (00333 UT) Cap capsule Take 1 Capsule by mouth every 14 days. 6 Capsule 1   • ALPRAZolam (XANAX) 0.5 MG Tab Take 1 Tablet by mouth 3 times a day as needed for Anxiety for up to 30 days. 75 Tablet 0   • lisinopril (PRINIVIL) 30 MG tablet Take 1 Tablet by mouth every day for high blood pressure 90 Tablet 3   • gabapentin (NEURONTIN) 300 MG Cap Take 1 capsule by mouth every bedtime. 30 Capsule 5   •  atorvastatin (LIPITOR) 20 MG Tab Take 1 Tablet by mouth every day. 30 Tablet 5   • cyclobenzaprine (FLEXERIL) 10 mg Tab Take 1 Tab by mouth 3 times a day as needed. 90 Tab 3     Current Facility-Administered Medications   Medication Dose Route Frequency Provider Last Rate Last Admin   • testosterone cypionate (DEPO-TESTOSTERONE) injection 100 mg  100 mg Intramuscular Q14 DAYS Cindy Hernandez A.P.R.N.           Social History     Tobacco Use   • Smoking status: Never Smoker   • Smokeless tobacco: Never Used   Vaping Use   • Vaping Use: Never used   Substance Use Topics   • Alcohol use: Yes     Comment: socially, moderate   • Drug use: No       Patient Active Problem List    Diagnosis Date Noted   • Low testosterone 06/15/2021   • Erectile dysfunction 05/19/2021   • Vitamin D deficiency 06/11/2020   • Post-traumatic stress disorder, unspecified 05/04/2020   • Anxiety 12/05/2019   • Bilateral impacted cerumen 12/05/2019   • Major depressive disorder 11/06/2019   • Encounter to establish care 09/04/2019   • Essential hypertension 09/04/2019   • Chronic midline low back pain with right-sided sciatica 09/04/2019   • Obesity (BMI 30-39.9) 09/04/2019       Family History   Problem Relation Age of Onset   • Hypertension Maternal Grandfather    • Heart Disease Maternal Grandfather           Objective:     /88 (BP Location: Left arm, Patient Position: Sitting, BP Cuff Size: Adult)   Pulse 76   Temp 36.4 °C (97.6 °F) (Temporal)   Resp 12   Ht 1.829 m (6')   Wt 107 kg (235 lb)   SpO2 95%  Body mass index is 31.87 kg/m².    Physical Exam:  Physical Exam  Constitutional:       General: He is not in acute distress.  HENT:      Head: Normocephalic.      Right Ear: Tympanic membrane and external ear normal. There is impacted cerumen.      Left Ear: Tympanic membrane and external ear normal.   Eyes:      Conjunctiva/sclera: Conjunctivae normal.      Pupils: Pupils are equal, round, and reactive to light.   Neck:       Trachea: No tracheal deviation.   Cardiovascular:      Rate and Rhythm: Normal rate and regular rhythm.      Heart sounds: Normal heart sounds.   Pulmonary:      Effort: Pulmonary effort is normal.      Breath sounds: Normal breath sounds.   Abdominal:      General: Bowel sounds are normal.      Palpations: Abdomen is soft.   Musculoskeletal:         General: Normal range of motion.      Cervical back: Normal range of motion and neck supple.   Lymphadenopathy:      Head:      Right side of head: No preauricular adenopathy.      Left side of head: No preauricular adenopathy.      Cervical: No cervical adenopathy.   Skin:     General: Skin is warm and dry.   Neurological:      Mental Status: He is alert and oriented to person, place, and time.      Cranial Nerves: Cranial nerves are intact.      Sensory: Sensation is intact.      Gait: Gait is intact.   Psychiatric:         Mood and Affect: Affect normal.         Judgment: Judgment normal.     Procedure note    Procedure: Cerumen removal  Indications: Impacted cerumen    Patient was prepped and bilateral ears were lavaged by medical assistant.  No residual wax, resolution of impaction. TM visualized with otoscope.    Code 06034      Assessment and Plan:     The following treatment plan was discussed:    1. Bilateral impacted cerumen  -  Chronic problem, resolved.  See above note    2. Anxiety  ALPRAZolam (XANAX) 0.5 MG Tab    ALPRAZolam (XANAX) 0.5 MG Tab    ALPRAZolam (XANAX) 0.5 MG Tab  -Chronic problem, stable.  Medication refilled.  PDMP reviewed.       Any change or worsening of signs or symptoms, patient encouraged to follow-up or report to emergency room for further evaluation. Patient verbalizes understanding and agrees.    Follow-Up: Return in about 3 months (around 4/25/2022) for Controlled substance management, labs for testosterone.      PLEASE NOTE: This dictation was created using voice recognition software. I have made every reasonable attempt to  correct obvious errors, but I expect that there are errors of grammar and possibly content that I did not discover before finalizing the note.

## 2022-02-08 PROCEDURE — RXMED WILLOW AMBULATORY MEDICATION CHARGE: Performed by: NURSE PRACTITIONER

## 2022-02-09 PROCEDURE — RXMED WILLOW AMBULATORY MEDICATION CHARGE: Performed by: NURSE PRACTITIONER

## 2022-02-15 ENCOUNTER — NON-PROVIDER VISIT (OUTPATIENT)
Dept: MEDICAL GROUP | Facility: MEDICAL CENTER | Age: 62
End: 2022-02-15
Attending: PHYSICIAN ASSISTANT
Payer: MEDICAID

## 2022-02-15 PROCEDURE — 96372 THER/PROPH/DIAG INJ SC/IM: CPT | Performed by: PHYSICIAN ASSISTANT

## 2022-02-15 PROCEDURE — 700111 HCHG RX REV CODE 636 W/ 250 OVERRIDE (IP): Performed by: NURSE PRACTITIONER

## 2022-02-15 RX ADMIN — TESTOSTERONE CYPIONATE 100 MG: 200 INJECTION, SOLUTION INTRAMUSCULAR at 13:26

## 2022-02-15 NOTE — PROGRESS NOTES
Sammy Romo is a 61 y.o. male here for a non-provider visit for testosterone injection.    Reason for injection: hypogonadism   Order in MAR?: Yes  Patient supplied?:No  Minimum interval has been met for this injection (per MAR order): Yes    Patient tolerated injection and no adverse effects were observed or reported: Yes    # of Administrations remaining in MAR: 2

## 2022-02-28 ENCOUNTER — PHARMACY VISIT (OUTPATIENT)
Dept: PHARMACY | Facility: MEDICAL CENTER | Age: 62
End: 2022-02-28
Payer: COMMERCIAL

## 2022-03-07 PROCEDURE — RXMED WILLOW AMBULATORY MEDICATION CHARGE: Performed by: NURSE PRACTITIONER

## 2022-03-11 ENCOUNTER — PHARMACY VISIT (OUTPATIENT)
Dept: PHARMACY | Facility: MEDICAL CENTER | Age: 62
End: 2022-03-11
Payer: COMMERCIAL

## 2022-03-11 PROCEDURE — RXMED WILLOW AMBULATORY MEDICATION CHARGE: Performed by: NURSE PRACTITIONER

## 2022-03-29 ENCOUNTER — OFFICE VISIT (OUTPATIENT)
Dept: MEDICAL GROUP | Facility: MEDICAL CENTER | Age: 62
End: 2022-03-29
Attending: NURSE PRACTITIONER
Payer: MEDICAID

## 2022-03-29 VITALS
SYSTOLIC BLOOD PRESSURE: 126 MMHG | TEMPERATURE: 97.8 F | HEIGHT: 72 IN | HEART RATE: 81 BPM | RESPIRATION RATE: 14 BRPM | BODY MASS INDEX: 31.29 KG/M2 | OXYGEN SATURATION: 93 % | WEIGHT: 231 LBS | DIASTOLIC BLOOD PRESSURE: 82 MMHG

## 2022-03-29 DIAGNOSIS — Z13.228 SCREENING FOR METABOLIC DISORDER: ICD-10-CM

## 2022-03-29 DIAGNOSIS — Z13.21 ENCOUNTER FOR VITAMIN DEFICIENCY SCREENING: ICD-10-CM

## 2022-03-29 DIAGNOSIS — R22.32 MASS OF LEFT UPPER EXTREMITY: ICD-10-CM

## 2022-03-29 DIAGNOSIS — Z13.6 SCREENING FOR CARDIOVASCULAR CONDITION: ICD-10-CM

## 2022-03-29 DIAGNOSIS — R79.89 LOW TESTOSTERONE: ICD-10-CM

## 2022-03-29 DIAGNOSIS — F41.9 ANXIETY: ICD-10-CM

## 2022-03-29 PROCEDURE — 99213 OFFICE O/P EST LOW 20 MIN: CPT | Mod: 25 | Performed by: NURSE PRACTITIONER

## 2022-03-29 PROCEDURE — 700111 HCHG RX REV CODE 636 W/ 250 OVERRIDE (IP): Performed by: NURSE PRACTITIONER

## 2022-03-29 PROCEDURE — 99214 OFFICE O/P EST MOD 30 MIN: CPT | Performed by: NURSE PRACTITIONER

## 2022-03-29 PROCEDURE — 96372 THER/PROPH/DIAG INJ SC/IM: CPT | Performed by: NURSE PRACTITIONER

## 2022-03-29 RX ORDER — ALPRAZOLAM 0.5 MG/1
0.5 TABLET ORAL 3 TIMES DAILY PRN
Qty: 90 TABLET | Refills: 0 | Status: SHIPPED | OUTPATIENT
Start: 2022-05-10 | End: 2022-07-02

## 2022-03-29 RX ORDER — TESTOSTERONE CYPIONATE 200 MG/ML
100 INJECTION, SOLUTION INTRAMUSCULAR
Status: SHIPPED | OUTPATIENT
Start: 2022-03-29 | End: 2022-06-21

## 2022-03-29 RX ORDER — ALPRAZOLAM 0.5 MG/1
0.5 TABLET ORAL 3 TIMES DAILY PRN
Qty: 90 TABLET | Refills: 0 | Status: SHIPPED | OUTPATIENT
Start: 2022-06-09 | End: 2022-07-07

## 2022-03-29 RX ORDER — ALPRAZOLAM 0.5 MG/1
0.5 TABLET ORAL 3 TIMES DAILY PRN
Qty: 90 TABLET | Refills: 0 | Status: SHIPPED | OUTPATIENT
Start: 2022-04-10 | End: 2022-07-07

## 2022-03-29 RX ADMIN — TESTOSTERONE CYPIONATE 100 MG: 200 INJECTION, SOLUTION INTRAMUSCULAR at 13:32

## 2022-03-29 ASSESSMENT — ENCOUNTER SYMPTOMS
DIARRHEA: 0
WEIGHT LOSS: 0
SHORTNESS OF BREATH: 0
COUGH: 0
ABDOMINAL PAIN: 0
FEVER: 0
PALPITATIONS: 0
BLOOD IN STOOL: 0
WHEEZING: 0
CONSTIPATION: 0
CHILLS: 0

## 2022-03-29 NOTE — ASSESSMENT & PLAN NOTE
Patient reports that he has had a mass on the posterior upper left arm for approximately 40 years.  He denies changes in size.  He denies tenderness to the lesion.  He denies ulceration or drainage.  He does report that he has noticed that if he puts pressure on the posterior aspect of his arm medial to his elbow that he feels a pulsing sensation in the posterior aspect of his arm.  He denies numbness and tingling.  He denies pain.  He reports that he is able to palpate the pulsing intermittently.  He denies trauma.

## 2022-03-29 NOTE — ASSESSMENT & PLAN NOTE
Patient reports he continues to benefit from testosterone injections every other week.  He denies side effects.

## 2022-03-29 NOTE — PROGRESS NOTES
Chief Complaint   Patient presents with   • Follow-Up     Testosterone injection        Subjective:     HPI:   Sammy Romo is a 61 y.o. male here to discuss the evaluation and management of:        Anxiety  Patient continues to benefit from as needed Xanax without SE.      Low testosterone  Patient reports he continues to benefit from testosterone injections every other week.  He denies side effects.    Mass of left upper extremity  Patient reports that he has had a mass on the posterior upper left arm for approximately 40 years.  He denies changes in size.  He denies tenderness to the lesion.  He denies ulceration or drainage.  He does report that he has noticed that if he puts pressure on the posterior aspect of his arm medial to his elbow that he feels a pulsing sensation in the posterior aspect of his arm.  He denies numbness and tingling.  He denies pain.  He reports that he is able to palpate the pulsing intermittently.  He denies trauma.      ROS  Review of Systems   Constitutional: Negative for chills, fever, malaise/fatigue and weight loss.   Respiratory: Negative for cough, shortness of breath and wheezing.    Cardiovascular: Negative for chest pain, palpitations and leg swelling.   Gastrointestinal: Negative for abdominal pain, blood in stool, constipation and diarrhea.         No Known Allergies    Current medicines (including changes today)  Current Outpatient Medications   Medication Sig Dispense Refill   • [START ON 4/10/2022] ALPRAZolam (XANAX) 0.5 MG Tab Take 1 Tablet by mouth 3 times a day as needed for Anxiety for up to 30 days. 90 Tablet 0   • [START ON 5/10/2022] ALPRAZolam (XANAX) 0.5 MG Tab Take 1 Tablet by mouth 3 times a day as needed for Anxiety for up to 30 days. 90 Tablet 0   • [START ON 6/9/2022] ALPRAZolam (XANAX) 0.5 MG Tab Take 1 Tablet by mouth 3 times a day as needed for Anxiety for up to 30 days. 90 Tablet 0   • ALPRAZolam (XANAX) 0.5 MG Tab Take 1 Tablet by mouth 3 times a day as  needed for Anxiety for up to 30 days. FILL 2/13/22 75 Tablet 0   • ALPRAZolam (XANAX) 0.5 MG Tab Take 1 Tablet by mouth 3 times a day as needed for Anxiety for up to 30 days. FILL 3/15/22 75 Tablet 0   • [START ON 4/14/2022] ALPRAZolam (XANAX) 0.5 MG Tab Take 1 Tablet by mouth 3 times a day as needed for Anxiety for up to 30 days. FILL 4/14/22 75 Tablet 0   • vitamin D2, Ergocalciferol, (DRISDOL) 1.25 MG (00156 UT) Cap capsule Take 1 Capsule by mouth every 14 days. 6 Capsule 1   • lisinopril (PRINIVIL) 30 MG tablet Take 1 Tablet by mouth every day for high blood pressure 90 Tablet 3   • gabapentin (NEURONTIN) 300 MG Cap Take 1 capsule by mouth every bedtime. 30 Capsule 5   • atorvastatin (LIPITOR) 20 MG Tab Take 1 Tablet by mouth every day. 30 Tablet 5   • cyclobenzaprine (FLEXERIL) 10 mg Tab Take 1 Tab by mouth 3 times a day as needed. 90 Tab 3     Current Facility-Administered Medications   Medication Dose Route Frequency Provider Last Rate Last Admin   • testosterone cypionate (DEPO-TESTOSTERONE) injection 100 mg  100 mg Intramuscular Q14 DAYS Cindy ROD. David, A.P.R.N.   100 mg at 03/29/22 1332   • testosterone cypionate (DEPO-TESTOSTERONE) injection 100 mg  100 mg Intramuscular Q14 DAYS Cindy Hernandez, A.P.R.N.   100 mg at 02/15/22 1326       Social History     Tobacco Use   • Smoking status: Never Smoker   • Smokeless tobacco: Never Used   Vaping Use   • Vaping Use: Never used   Substance Use Topics   • Alcohol use: Yes     Comment: socially, moderate   • Drug use: No       Patient Active Problem List    Diagnosis Date Noted   • Mass of left upper extremity 03/29/2022   • Low testosterone 06/15/2021   • Erectile dysfunction 05/19/2021   • Vitamin D deficiency 06/11/2020   • Post-traumatic stress disorder, unspecified 05/04/2020   • Anxiety 12/05/2019   • Bilateral impacted cerumen 12/05/2019   • Major depressive disorder 11/06/2019   • Encounter to [facility] care 09/04/2019   • Essential hypertension  09/04/2019   • Chronic midline low back pain with right-sided sciatica 09/04/2019   • Obesity (BMI 30-39.9) 09/04/2019       Family History   Problem Relation Age of Onset   • Hypertension Maternal Grandfather    • Heart Disease Maternal Grandfather           Objective:     /82 (BP Location: Left arm, Patient Position: Sitting, BP Cuff Size: Adult)   Pulse 81   Temp 36.6 °C (97.8 °F) (Temporal)   Resp 14   Ht 1.829 m (6')   Wt 105 kg (231 lb)   SpO2 93%  Body mass index is 31.33 kg/m².    Physical Exam:  Physical Exam  Constitutional:       General: He is not in acute distress.  HENT:      Head: Normocephalic.      Right Ear: Tympanic membrane and external ear normal.      Left Ear: Tympanic membrane and external ear normal.   Eyes:      Conjunctiva/sclera: Conjunctivae normal.      Pupils: Pupils are equal, round, and reactive to light.   Neck:      Trachea: No tracheal deviation.   Cardiovascular:      Rate and Rhythm: Normal rate and regular rhythm.      Heart sounds: Normal heart sounds.   Pulmonary:      Effort: Pulmonary effort is normal.      Breath sounds: Normal breath sounds.   Abdominal:      General: Bowel sounds are normal.      Palpations: Abdomen is soft.   Musculoskeletal:         General: Normal range of motion.      Cervical back: Normal range of motion and neck supple.   Lymphadenopathy:      Head:      Right side of head: No preauricular adenopathy.      Left side of head: No preauricular adenopathy.      Cervical: No cervical adenopathy.   Skin:     General: Skin is warm and dry.      Comments: Small, mobile, round mass of posterior left upper extremity, medial to the elbow.   Neurological:      Mental Status: He is alert and oriented to person, place, and time.      Cranial Nerves: Cranial nerves are intact.      Sensory: Sensation is intact.      Gait: Gait is intact.   Psychiatric:         Mood and Affect: Affect normal.         Judgment: Judgment normal.         Assessment and  Plan:     The following treatment plan was discussed:    1. Mass of left upper extremity  US-EXTREMITY NON VASCULAR UNILATERAL LEFT  -Chronic problem, unstable.  Suspect that the patient is feeling his brachial artery pulse but we will get some imaging as he has had what I suspect is a lipoma for many years.   2. Anxiety  ALPRAZolam (XANAX) 0.5 MG Tab    ALPRAZolam (XANAX) 0.5 MG Tab    ALPRAZolam (XANAX) 0.5 MG Tab  -Chronic problem, stable.  Medication refill.   3. Low testosterone  testosterone cypionate (DEPO-TESTOSTERONE) injection 100 mg    CBC WITHOUT DIFFERENTIAL    PROSTATE SPECIFIC AG SCREENING    Testosterone, Free & Total, Adult Male (w/SHBG)  -Chronic problem, stable.  Medication refill.  Labs as above.   4. Screening for metabolic disorder  HEMOGLOBIN A1C    MICROALBUMIN CREAT RATIO URINE    TSH WITH REFLEX TO FT4    Comp Metabolic Panel   5. Screening for cardiovascular condition  Lipid Profile   6. Encounter for vitamin deficiency screening  VITAMIN D,25 HYDROXY       Any change or worsening of signs or symptoms, patient encouraged to follow-up or report to emergency room for further evaluation. Patient verbalizes understanding and agrees.    Follow-Up: Return in about 3 months (around 6/29/2022) for Controlled substance management.      PLEASE NOTE: This dictation was created using voice recognition software. I have made every reasonable attempt to correct obvious errors, but I expect that there are errors of grammar and possibly content that I did not discover before finalizing the note.

## 2022-04-08 DIAGNOSIS — G89.29 CHRONIC MIDLINE LOW BACK PAIN WITH RIGHT-SIDED SCIATICA: ICD-10-CM

## 2022-04-08 DIAGNOSIS — M54.41 CHRONIC MIDLINE LOW BACK PAIN WITH RIGHT-SIDED SCIATICA: ICD-10-CM

## 2022-04-08 PROCEDURE — RXMED WILLOW AMBULATORY MEDICATION CHARGE: Performed by: NURSE PRACTITIONER

## 2022-04-12 ENCOUNTER — HOSPITAL ENCOUNTER (OUTPATIENT)
Dept: RADIOLOGY | Facility: MEDICAL CENTER | Age: 62
End: 2022-04-12
Attending: NURSE PRACTITIONER
Payer: MEDICAID

## 2022-04-12 ENCOUNTER — HOSPITAL ENCOUNTER (OUTPATIENT)
Dept: LAB | Facility: MEDICAL CENTER | Age: 62
End: 2022-04-12
Attending: NURSE PRACTITIONER
Payer: MEDICAID

## 2022-04-12 DIAGNOSIS — Z13.6 SCREENING FOR CARDIOVASCULAR CONDITION: ICD-10-CM

## 2022-04-12 DIAGNOSIS — Z13.21 ENCOUNTER FOR VITAMIN DEFICIENCY SCREENING: ICD-10-CM

## 2022-04-12 DIAGNOSIS — Z13.228 SCREENING FOR METABOLIC DISORDER: ICD-10-CM

## 2022-04-12 DIAGNOSIS — R22.32 MASS OF LEFT UPPER EXTREMITY: ICD-10-CM

## 2022-04-12 DIAGNOSIS — R79.89 LOW TESTOSTERONE: ICD-10-CM

## 2022-04-12 LAB
25(OH)D3 SERPL-MCNC: 25 NG/ML (ref 30–100)
ALBUMIN SERPL BCP-MCNC: 4.5 G/DL (ref 3.2–4.9)
ALBUMIN/GLOB SERPL: 1.5 G/DL
ALP SERPL-CCNC: 80 U/L (ref 30–99)
ALT SERPL-CCNC: 21 U/L (ref 2–50)
ANION GAP SERPL CALC-SCNC: 8 MMOL/L (ref 7–16)
AST SERPL-CCNC: 21 U/L (ref 12–45)
BILIRUB SERPL-MCNC: 0.7 MG/DL (ref 0.1–1.5)
BUN SERPL-MCNC: 14 MG/DL (ref 8–22)
CALCIUM SERPL-MCNC: 9.1 MG/DL (ref 8.5–10.5)
CHLORIDE SERPL-SCNC: 103 MMOL/L (ref 96–112)
CHOLEST SERPL-MCNC: 125 MG/DL (ref 100–199)
CO2 SERPL-SCNC: 29 MMOL/L (ref 20–33)
CREAT SERPL-MCNC: 0.87 MG/DL (ref 0.5–1.4)
CREAT UR-MCNC: 207.63 MG/DL
ERYTHROCYTE [DISTWIDTH] IN BLOOD BY AUTOMATED COUNT: 44.4 FL (ref 35.9–50)
EST. AVERAGE GLUCOSE BLD GHB EST-MCNC: 123 MG/DL
GFR SERPLBLD CREATININE-BSD FMLA CKD-EPI: 98 ML/MIN/1.73 M 2
GLOBULIN SER CALC-MCNC: 3.1 G/DL (ref 1.9–3.5)
GLUCOSE SERPL-MCNC: 123 MG/DL (ref 65–99)
HBA1C MFR BLD: 5.9 % (ref 4–5.6)
HCT VFR BLD AUTO: 45.6 % (ref 42–52)
HDLC SERPL-MCNC: 60 MG/DL
HGB BLD-MCNC: 15 G/DL (ref 14–18)
LDLC SERPL CALC-MCNC: 53 MG/DL
MCH RBC QN AUTO: 30.2 PG (ref 27–33)
MCHC RBC AUTO-ENTMCNC: 32.9 G/DL (ref 33.7–35.3)
MCV RBC AUTO: 91.9 FL (ref 81.4–97.8)
MICROALBUMIN UR-MCNC: <1.2 MG/DL
MICROALBUMIN/CREAT UR: NORMAL MG/G (ref 0–30)
PLATELET # BLD AUTO: 191 K/UL (ref 164–446)
PMV BLD AUTO: 12.2 FL (ref 9–12.9)
POTASSIUM SERPL-SCNC: 3.8 MMOL/L (ref 3.6–5.5)
PROT SERPL-MCNC: 7.6 G/DL (ref 6–8.2)
PSA SERPL-MCNC: 0.55 NG/ML (ref 0–4)
RBC # BLD AUTO: 4.96 M/UL (ref 4.7–6.1)
SODIUM SERPL-SCNC: 140 MMOL/L (ref 135–145)
TRIGL SERPL-MCNC: 59 MG/DL (ref 0–149)
TSH SERPL DL<=0.005 MIU/L-ACNC: 1.8 UIU/ML (ref 0.38–5.33)
WBC # BLD AUTO: 5.3 K/UL (ref 4.8–10.8)

## 2022-04-12 PROCEDURE — 84402 ASSAY OF FREE TESTOSTERONE: CPT

## 2022-04-12 PROCEDURE — 82306 VITAMIN D 25 HYDROXY: CPT

## 2022-04-12 PROCEDURE — 82570 ASSAY OF URINE CREATININE: CPT

## 2022-04-12 PROCEDURE — 85027 COMPLETE CBC AUTOMATED: CPT

## 2022-04-12 PROCEDURE — 80053 COMPREHEN METABOLIC PANEL: CPT

## 2022-04-12 PROCEDURE — 84443 ASSAY THYROID STIM HORMONE: CPT

## 2022-04-12 PROCEDURE — 84270 ASSAY OF SEX HORMONE GLOBUL: CPT

## 2022-04-12 PROCEDURE — 80061 LIPID PANEL: CPT

## 2022-04-12 PROCEDURE — 76882 US LMTD JT/FCL EVL NVASC XTR: CPT | Mod: LT

## 2022-04-12 PROCEDURE — 84153 ASSAY OF PSA TOTAL: CPT

## 2022-04-12 PROCEDURE — 84403 ASSAY OF TOTAL TESTOSTERONE: CPT

## 2022-04-12 PROCEDURE — 83036 HEMOGLOBIN GLYCOSYLATED A1C: CPT

## 2022-04-12 PROCEDURE — 82043 UR ALBUMIN QUANTITATIVE: CPT

## 2022-04-12 PROCEDURE — 36415 COLL VENOUS BLD VENIPUNCTURE: CPT

## 2022-04-13 RX ORDER — GABAPENTIN 300 MG/1
300 CAPSULE ORAL
Qty: 30 CAPSULE | Refills: 5 | Status: SHIPPED | OUTPATIENT
Start: 2022-04-13 | End: 2022-07-07 | Stop reason: SDUPTHER

## 2022-04-13 NOTE — RESULT ENCOUNTER NOTE
Please call pt and let them know that I reviewed his ultrasound and lab results.  Ultrasound shows that the lump on his arm is most likely a lipoma or ball of fat-this is benign and we would only need to remove it if it becomes bothersome.  His cholesterol level has improved dramatically with the atorvastatin-it is now at goal!  Kidney function is normal.  Liver function is normal.  His prostate lab is normal.  His thyroid function is normal.  His blood sugar has increased slightly and he is back to being prediabetic.  I would like to see him increase exercise and decrease carbohydrates in his diet like bread, rice, pasta, tortillas, candy, cookies, soda...        The testosterone is not affecting his complete blood count, this is good.  His vitamin D is on the low side, please let me know if he is taking any vitamin D.  The testosterone lab is still running so we will reach out with those results but we will plan on repeating labs in 3 months to check his blood sugar.

## 2022-04-14 LAB
SHBG SERPL-SCNC: 35 NMOL/L (ref 19–76)
TESTOST FREE MFR SERPL: 1.7 % (ref 1.6–2.9)
TESTOST FREE SERPL-MCNC: 50 PG/ML (ref 47–244)
TESTOST SERPL-MCNC: 287 NG/DL (ref 300–720)

## 2022-04-19 ENCOUNTER — NON-PROVIDER VISIT (OUTPATIENT)
Dept: MEDICAL GROUP | Facility: MEDICAL CENTER | Age: 62
End: 2022-04-19
Attending: NURSE PRACTITIONER
Payer: MEDICAID

## 2022-04-19 PROCEDURE — 99999 PR NO CHARGE: CPT | Performed by: NURSE PRACTITIONER

## 2022-04-19 PROCEDURE — 700111 HCHG RX REV CODE 636 W/ 250 OVERRIDE (IP): Performed by: NURSE PRACTITIONER

## 2022-04-19 PROCEDURE — 96372 THER/PROPH/DIAG INJ SC/IM: CPT | Performed by: NURSE PRACTITIONER

## 2022-04-19 RX ADMIN — TESTOSTERONE CYPIONATE 100 MG: 200 INJECTION, SOLUTION INTRAMUSCULAR at 11:16

## 2022-04-19 NOTE — NON-PROVIDER
Sammy Romo is a 62 y.o. male here for a non-provider visit for testosterone cypionate injection.    Reason for injection: PCP ordered   Order in MAR?: Yes  Patient supplied?:No  Minimum interval has been met for this injection (per MAR order): Yes    Patient tolerated injection and no adverse effects were observed or reported: Yes    # of Administrations remaining in MAR:

## 2022-04-20 ENCOUNTER — PHARMACY VISIT (OUTPATIENT)
Dept: PHARMACY | Facility: MEDICAL CENTER | Age: 62
End: 2022-04-20
Payer: COMMERCIAL

## 2022-05-05 ENCOUNTER — PHARMACY VISIT (OUTPATIENT)
Dept: PHARMACY | Facility: MEDICAL CENTER | Age: 62
End: 2022-05-05
Payer: COMMERCIAL

## 2022-05-05 PROCEDURE — RXMED WILLOW AMBULATORY MEDICATION CHARGE: Performed by: NURSE PRACTITIONER

## 2022-05-10 ENCOUNTER — NON-PROVIDER VISIT (OUTPATIENT)
Dept: MEDICAL GROUP | Facility: MEDICAL CENTER | Age: 62
End: 2022-05-10
Attending: FAMILY MEDICINE
Payer: MEDICAID

## 2022-05-10 DIAGNOSIS — N52.9 ERECTILE DYSFUNCTION, UNSPECIFIED ERECTILE DYSFUNCTION TYPE: ICD-10-CM

## 2022-05-10 PROCEDURE — 96372 THER/PROPH/DIAG INJ SC/IM: CPT | Performed by: FAMILY MEDICINE

## 2022-05-10 PROCEDURE — 700111 HCHG RX REV CODE 636 W/ 250 OVERRIDE (IP): Performed by: NURSE PRACTITIONER

## 2022-05-10 RX ADMIN — TESTOSTERONE CYPIONATE 100 MG: 200 INJECTION, SOLUTION INTRAMUSCULAR at 11:09

## 2022-05-10 NOTE — PROGRESS NOTES
Sammy Romo is a 62 y.o. male here for a non-provider visit for testosterone  injection.    Reason for injection: hypogonadism   Order in MAR?: Yes  Patient supplied?:No  Minimum interval has been met for this injection (per MAR order): Yes    Patient tolerated injection and no adverse effects were observed or reported: Yes    # of Administrations remaining in MAR: 2

## 2022-06-02 ENCOUNTER — PHARMACY VISIT (OUTPATIENT)
Dept: PHARMACY | Facility: MEDICAL CENTER | Age: 62
End: 2022-06-02
Payer: COMMERCIAL

## 2022-06-02 PROCEDURE — RXMED WILLOW AMBULATORY MEDICATION CHARGE: Performed by: NURSE PRACTITIONER

## 2022-06-30 ENCOUNTER — TELEPHONE (OUTPATIENT)
Dept: MEDICAL GROUP | Facility: MEDICAL CENTER | Age: 62
End: 2022-06-30
Payer: MEDICAID

## 2022-06-30 ENCOUNTER — PHARMACY VISIT (OUTPATIENT)
Dept: PHARMACY | Facility: MEDICAL CENTER | Age: 62
End: 2022-06-30
Payer: COMMERCIAL

## 2022-06-30 DIAGNOSIS — E78.5 HYPERLIPIDEMIA, UNSPECIFIED HYPERLIPIDEMIA TYPE: ICD-10-CM

## 2022-06-30 PROCEDURE — RXMED WILLOW AMBULATORY MEDICATION CHARGE: Performed by: NURSE PRACTITIONER

## 2022-06-30 RX ORDER — ATORVASTATIN CALCIUM 20 MG/1
20 TABLET, FILM COATED ORAL DAILY
Qty: 30 TABLET | Refills: 5 | Status: SHIPPED | OUTPATIENT
Start: 2022-06-30 | End: 2022-07-07 | Stop reason: SDUPTHER

## 2022-07-07 ENCOUNTER — OFFICE VISIT (OUTPATIENT)
Dept: MEDICAL GROUP | Facility: MEDICAL CENTER | Age: 62
End: 2022-07-07
Attending: NURSE PRACTITIONER
Payer: MEDICAID

## 2022-07-07 VITALS
TEMPERATURE: 96.9 F | BODY MASS INDEX: 26.41 KG/M2 | HEIGHT: 77 IN | RESPIRATION RATE: 18 BRPM | DIASTOLIC BLOOD PRESSURE: 82 MMHG | HEART RATE: 77 BPM | OXYGEN SATURATION: 95 % | SYSTOLIC BLOOD PRESSURE: 120 MMHG | WEIGHT: 223.7 LBS

## 2022-07-07 DIAGNOSIS — R79.89 LOW TESTOSTERONE: ICD-10-CM

## 2022-07-07 DIAGNOSIS — Z51.81 ENCOUNTER FOR MONITORING TESTOSTERONE REPLACEMENT THERAPY: ICD-10-CM

## 2022-07-07 DIAGNOSIS — Z12.12 SCREENING FOR COLORECTAL CANCER: ICD-10-CM

## 2022-07-07 DIAGNOSIS — M54.41 CHRONIC MIDLINE LOW BACK PAIN WITH RIGHT-SIDED SCIATICA: ICD-10-CM

## 2022-07-07 DIAGNOSIS — Z12.11 SCREENING FOR COLORECTAL CANCER: ICD-10-CM

## 2022-07-07 DIAGNOSIS — I10 ESSENTIAL HYPERTENSION: ICD-10-CM

## 2022-07-07 DIAGNOSIS — E78.5 HYPERLIPIDEMIA, UNSPECIFIED HYPERLIPIDEMIA TYPE: ICD-10-CM

## 2022-07-07 DIAGNOSIS — E55.9 VITAMIN D DEFICIENCY: ICD-10-CM

## 2022-07-07 DIAGNOSIS — G89.29 CHRONIC MIDLINE LOW BACK PAIN WITH RIGHT-SIDED SCIATICA: ICD-10-CM

## 2022-07-07 DIAGNOSIS — Z79.890 ENCOUNTER FOR MONITORING TESTOSTERONE REPLACEMENT THERAPY: ICD-10-CM

## 2022-07-07 DIAGNOSIS — F41.9 ANXIETY: ICD-10-CM

## 2022-07-07 DIAGNOSIS — R73.03 PREDIABETES: ICD-10-CM

## 2022-07-07 PROCEDURE — 99213 OFFICE O/P EST LOW 20 MIN: CPT | Performed by: NURSE PRACTITIONER

## 2022-07-07 PROCEDURE — 99214 OFFICE O/P EST MOD 30 MIN: CPT | Performed by: NURSE PRACTITIONER

## 2022-07-07 RX ORDER — ALPRAZOLAM 0.5 MG/1
0.5 TABLET ORAL 3 TIMES DAILY PRN
Qty: 90 TABLET | Refills: 0 | Status: SHIPPED | OUTPATIENT
Start: 2022-09-27 | End: 2022-10-27

## 2022-07-07 RX ORDER — CYCLOBENZAPRINE HCL 10 MG
10 TABLET ORAL 3 TIMES DAILY PRN
Qty: 90 TABLET | Refills: 5 | Status: SHIPPED | OUTPATIENT
Start: 2022-07-07

## 2022-07-07 RX ORDER — ALPRAZOLAM 0.5 MG/1
0.5 TABLET ORAL 3 TIMES DAILY PRN
Qty: 90 TABLET | Refills: 0 | Status: SHIPPED | OUTPATIENT
Start: 2022-07-29 | End: 2022-08-28

## 2022-07-07 RX ORDER — GABAPENTIN 300 MG/1
300 CAPSULE ORAL
Qty: 30 CAPSULE | Refills: 11 | Status: SHIPPED | OUTPATIENT
Start: 2022-07-07

## 2022-07-07 RX ORDER — ERGOCALCIFEROL 1.25 MG/1
50000 CAPSULE ORAL
Qty: 6 CAPSULE | Refills: 1 | Status: SHIPPED | OUTPATIENT
Start: 2022-07-07

## 2022-07-07 RX ORDER — ALPRAZOLAM 0.5 MG/1
0.5 TABLET ORAL 3 TIMES DAILY PRN
Qty: 90 TABLET | Refills: 0 | Status: SHIPPED | OUTPATIENT
Start: 2022-08-28 | End: 2022-10-23

## 2022-07-07 RX ORDER — LISINOPRIL 30 MG/1
30 TABLET ORAL DAILY
Qty: 90 TABLET | Refills: 3 | Status: SHIPPED | OUTPATIENT
Start: 2022-07-07 | End: 2023-12-11 | Stop reason: SDUPTHER

## 2022-07-07 RX ORDER — TESTOSTERONE CYPIONATE 200 MG/ML
50 INJECTION, SOLUTION INTRAMUSCULAR
Status: SHIPPED | OUTPATIENT
Start: 2022-07-07 | End: 2022-10-13

## 2022-07-07 RX ORDER — ATORVASTATIN CALCIUM 20 MG/1
20 TABLET, FILM COATED ORAL DAILY
Qty: 90 TABLET | Refills: 3 | Status: SHIPPED | OUTPATIENT
Start: 2022-07-07

## 2022-07-07 ASSESSMENT — ENCOUNTER SYMPTOMS
COUGH: 0
WHEEZING: 0
ABDOMINAL PAIN: 0
WEIGHT LOSS: 0
CONSTIPATION: 0
SHORTNESS OF BREATH: 0
FEVER: 0
CHILLS: 0
PALPITATIONS: 0
DIARRHEA: 0
BLOOD IN STOOL: 0

## 2022-07-07 ASSESSMENT — FIBROSIS 4 INDEX: FIB4 SCORE: 1.46

## 2022-07-07 ASSESSMENT — PATIENT HEALTH QUESTIONNAIRE - PHQ9: CLINICAL INTERPRETATION OF PHQ2 SCORE: 0

## 2022-07-07 NOTE — PROGRESS NOTES
No chief complaint on file.      Subjective:     HPI:   Sammy Romo is a 62 y.o. male here to discuss the evaluation and management of:        Anxiety  Patient reports he continues to benefit from Xanax.  He denies side effects from the medication.    Low testosterone  Patient reports that he continues to benefit from testosterone injections.  He denies side effects.  He reports that he is able to achieve and maintain an erection.      ROS  Review of Systems   Constitutional: Negative for chills, fever, malaise/fatigue and weight loss.   Respiratory: Negative for cough, shortness of breath and wheezing.    Cardiovascular: Negative for chest pain, palpitations and leg swelling.   Gastrointestinal: Negative for abdominal pain, blood in stool, constipation and diarrhea.         No Known Allergies    Current medicines (including changes today)  Current Outpatient Medications   Medication Sig Dispense Refill   • [START ON 7/29/2022] ALPRAZolam (XANAX) 0.5 MG Tab Take 1 Tablet by mouth 3 times a day as needed for Anxiety for up to 30 days. Ok to fill 7/29/22 90 Tablet 0   • [START ON 8/28/2022] ALPRAZolam (XANAX) 0.5 MG Tab Take 1 Tablet by mouth 3 times a day as needed for Anxiety for up to 30 days.Ok to fill 8/28/22 90 Tablet 0   • [START ON 9/27/2022] ALPRAZolam (XANAX) 0.5 MG Tab Take 1 Tablet by mouth 3 times a day as needed for Anxiety for up to 30 days.Ok to fill 9/27/22 90 Tablet 0   • atorvastatin (LIPITOR) 20 MG Tab Take 1 Tablet by mouth every day. 90 Tablet 3   • lisinopril (PRINIVIL) 30 MG tablet Take 1 Tablet by mouth every day for high blood pressure 90 Tablet 3   • cyclobenzaprine (FLEXERIL) 10 mg Tab Take 1 Tab by mouth 3 times a day as needed. 90 Tablet 5   • vitamin D2, Ergocalciferol, (DRISDOL) 1.25 MG (01633 UT) Cap capsule Take 1 Capsule by mouth every 14 days. 6 Capsule 1   • gabapentin (NEURONTIN) 300 MG Cap Take 1 capsule by mouth every bedtime. 30 Capsule 11     Current Facility-Administered  "Medications   Medication Dose Route Frequency Provider Last Rate Last Admin   • testosterone cypionate (DEPO-TESTOSTERONE) injection 50 mg  50 mg Intramuscular Q14 DAYS PHOENIX PetersRALEXANDREA           Social History     Tobacco Use   • Smoking status: Never Smoker   • Smokeless tobacco: Never Used   Vaping Use   • Vaping Use: Never used   Substance Use Topics   • Alcohol use: Yes     Comment: socially, moderate   • Drug use: No       Patient Active Problem List    Diagnosis Date Noted   • Mass of left upper extremity 03/29/2022   • Low testosterone 06/15/2021   • Erectile dysfunction 05/19/2021   • Vitamin D deficiency 06/11/2020   • Post-traumatic stress disorder, unspecified 05/04/2020   • Anxiety 12/05/2019   • Bilateral impacted cerumen 12/05/2019   • Major depressive disorder 11/06/2019   • Encounter to establish care 09/04/2019   • Essential hypertension 09/04/2019   • Chronic midline low back pain with right-sided sciatica 09/04/2019   • Obesity (BMI 30-39.9) 09/04/2019       Family History   Problem Relation Age of Onset   • Hypertension Maternal Grandfather    • Heart Disease Maternal Grandfather           Objective:     /82 (BP Location: Left arm, Patient Position: Sitting, BP Cuff Size: Adult)   Pulse 77   Temp 36.1 °C (96.9 °F) (Skin)   Resp 18   Ht 1.956 m (6' 5\")   Wt 101 kg (223 lb 11.2 oz)   SpO2 95%  Body mass index is 26.53 kg/m².    Physical Exam:  Physical Exam  Constitutional:       General: He is not in acute distress.  HENT:      Head: Normocephalic.      Right Ear: Tympanic membrane and external ear normal.      Left Ear: Tympanic membrane and external ear normal.   Eyes:      Conjunctiva/sclera: Conjunctivae normal.      Pupils: Pupils are equal, round, and reactive to light.   Neck:      Trachea: No tracheal deviation.   Cardiovascular:      Rate and Rhythm: Normal rate and regular rhythm.      Heart sounds: Normal heart sounds.   Pulmonary:      Effort: Pulmonary effort is " normal.      Breath sounds: Normal breath sounds.   Abdominal:      General: Bowel sounds are normal.      Palpations: Abdomen is soft.   Musculoskeletal:         General: Normal range of motion.      Cervical back: Normal range of motion and neck supple.   Lymphadenopathy:      Head:      Right side of head: No preauricular adenopathy.      Left side of head: No preauricular adenopathy.      Cervical: No cervical adenopathy.   Skin:     General: Skin is warm and dry.   Neurological:      Mental Status: He is alert and oriented to person, place, and time.      Cranial Nerves: Cranial nerves are intact.      Sensory: Sensation is intact.      Gait: Gait is intact.   Psychiatric:         Mood and Affect: Affect normal.         Judgment: Judgment normal.         Assessment and Plan:     The following treatment plan was discussed:    1. Low testosterone  Comp Metabolic Panel    CBC WITHOUT DIFFERENTIAL    testosterone cypionate (DEPO-TESTOSTERONE) injection 50 mg    Testosterone, Free & Total, Adult Male (w/SHBG)  -Chronic problem, unclear stability secondary to labs pending.  Symptomatically patient has improved.  We will check CBC, CMP, and testosterone levels.  Unfortunately we are out of stock of the testosterone and we will call the patient to get his injection we will get that in stock.   2. Encounter for monitoring testosterone replacement therapy  Comp Metabolic Panel    CBC WITHOUT DIFFERENTIAL    testosterone cypionate (DEPO-TESTOSTERONE) injection 50 mg    Testosterone, Free & Total, Adult Male (w/SHBG)  -See above   3. Prediabetes  HEMOGLOBIN A1C  -Chronic problem, unclear stability.  Repeat A1c.   4. Anxiety  ALPRAZolam (XANAX) 0.5 MG Tab    ALPRAZolam (XANAX) 0.5 MG Tab    ALPRAZolam (XANAX) 0.5 MG Tab  -Chronic problem, stable.  Medication refill.   5. Hyperlipidemia, unspecified hyperlipidemia type  atorvastatin (LIPITOR) 20 MG Tab  -Chronic problem, stable.  Medication refill.   6. Essential  hypertension  lisinopril (PRINIVIL) 30 MG tablet  -Chronic problem, stable.  Medication refill.   7. Chronic midline low back pain with right-sided sciatica  cyclobenzaprine (FLEXERIL) 10 mg Tab    gabapentin (NEURONTIN) 300 MG Cap  -Chronic problem, stable.  Medication refill.   8. Vitamin D deficiency  vitamin D2, Ergocalciferol, (DRISDOL) 1.25 MG (55165 UT) Cap capsule  -Chronic problem, stable.  Medication refill.   9. Screening for colorectal cancer  OCCULT BLOOD FECES IMMUNOASSAY (FIT)       Any change or worsening of signs or symptoms, patient encouraged to follow-up or report to emergency room for further evaluation. Patient verbalizes understanding and agrees.    Follow-Up: Return in about 10 weeks (around 9/15/2022) for Routine follow up, Controlled substance management.      PLEASE NOTE: This dictation was created using voice recognition software. I have made every reasonable attempt to correct obvious errors, but I expect that there are errors of grammar and possibly content that I did not discover before finalizing the note.

## 2022-07-08 NOTE — ASSESSMENT & PLAN NOTE
Patient reports that he continues to benefit from testosterone injections.  He denies side effects.  He reports that he is able to achieve and maintain an erection.

## 2022-07-20 ENCOUNTER — HOSPITAL ENCOUNTER (OUTPATIENT)
Dept: LAB | Facility: MEDICAL CENTER | Age: 62
End: 2022-07-20
Attending: NURSE PRACTITIONER
Payer: MEDICAID

## 2022-07-20 DIAGNOSIS — R73.03 PREDIABETES: ICD-10-CM

## 2022-07-20 DIAGNOSIS — Z51.81 ENCOUNTER FOR MONITORING TESTOSTERONE REPLACEMENT THERAPY: ICD-10-CM

## 2022-07-20 DIAGNOSIS — R79.89 LOW TESTOSTERONE: ICD-10-CM

## 2022-07-20 DIAGNOSIS — Z79.890 ENCOUNTER FOR MONITORING TESTOSTERONE REPLACEMENT THERAPY: ICD-10-CM

## 2022-07-20 LAB
ALBUMIN SERPL BCP-MCNC: 4.5 G/DL (ref 3.2–4.9)
ALBUMIN/GLOB SERPL: 1.3 G/DL
ALP SERPL-CCNC: 92 U/L (ref 30–99)
ALT SERPL-CCNC: 13 U/L (ref 2–50)
ANION GAP SERPL CALC-SCNC: 15 MMOL/L (ref 7–16)
AST SERPL-CCNC: 22 U/L (ref 12–45)
BILIRUB SERPL-MCNC: 0.6 MG/DL (ref 0.1–1.5)
BUN SERPL-MCNC: 11 MG/DL (ref 8–22)
CALCIUM SERPL-MCNC: 9.1 MG/DL (ref 8.5–10.5)
CHLORIDE SERPL-SCNC: 103 MMOL/L (ref 96–112)
CO2 SERPL-SCNC: 23 MMOL/L (ref 20–33)
CREAT SERPL-MCNC: 1.02 MG/DL (ref 0.5–1.4)
ERYTHROCYTE [DISTWIDTH] IN BLOOD BY AUTOMATED COUNT: 42.5 FL (ref 35.9–50)
EST. AVERAGE GLUCOSE BLD GHB EST-MCNC: 126 MG/DL
FASTING STATUS PATIENT QL REPORTED: NORMAL
GFR SERPLBLD CREATININE-BSD FMLA CKD-EPI: 83 ML/MIN/1.73 M 2
GLOBULIN SER CALC-MCNC: 3.4 G/DL (ref 1.9–3.5)
GLUCOSE SERPL-MCNC: 91 MG/DL (ref 65–99)
HBA1C MFR BLD: 6 % (ref 4–5.6)
HCT VFR BLD AUTO: 44.1 % (ref 42–52)
HGB BLD-MCNC: 14.8 G/DL (ref 14–18)
MCH RBC QN AUTO: 30.1 PG (ref 27–33)
MCHC RBC AUTO-ENTMCNC: 33.6 G/DL (ref 33.7–35.3)
MCV RBC AUTO: 89.6 FL (ref 81.4–97.8)
PLATELET # BLD AUTO: 184 K/UL (ref 164–446)
PMV BLD AUTO: 12 FL (ref 9–12.9)
POTASSIUM SERPL-SCNC: 3.7 MMOL/L (ref 3.6–5.5)
PROT SERPL-MCNC: 7.9 G/DL (ref 6–8.2)
RBC # BLD AUTO: 4.92 M/UL (ref 4.7–6.1)
SODIUM SERPL-SCNC: 141 MMOL/L (ref 135–145)
WBC # BLD AUTO: 5.4 K/UL (ref 4.8–10.8)

## 2022-07-20 PROCEDURE — 84402 ASSAY OF FREE TESTOSTERONE: CPT

## 2022-07-20 PROCEDURE — 85027 COMPLETE CBC AUTOMATED: CPT

## 2022-07-20 PROCEDURE — 84403 ASSAY OF TOTAL TESTOSTERONE: CPT

## 2022-07-20 PROCEDURE — 36415 COLL VENOUS BLD VENIPUNCTURE: CPT

## 2022-07-20 PROCEDURE — 84270 ASSAY OF SEX HORMONE GLOBUL: CPT

## 2022-07-20 PROCEDURE — 83036 HEMOGLOBIN GLYCOSYLATED A1C: CPT

## 2022-07-20 PROCEDURE — 80053 COMPREHEN METABOLIC PANEL: CPT

## 2022-07-22 LAB
SHBG SERPL-SCNC: 38 NMOL/L (ref 19–76)
TESTOST FREE MFR SERPL: 1.6 % (ref 1.6–2.9)
TESTOST FREE SERPL-MCNC: 13 PG/ML (ref 47–244)
TESTOST SERPL-MCNC: 82 NG/DL (ref 300–720)

## 2022-07-27 PROCEDURE — RXMED WILLOW AMBULATORY MEDICATION CHARGE: Performed by: NURSE PRACTITIONER

## 2022-07-27 NOTE — RESULT ENCOUNTER NOTE
Testosterone labs look good.  His blood sugar has gone up very slightly, he is still prediabetic but I want him to work on increasing activity and decreasing carbohydrates in his diet like bread, rice, pasta, tortillas, alcohol, cookies, sugar, soda...   His liver function is normal.  His kidney function is normal.  His electrolytes are normal.

## 2022-07-28 PROCEDURE — RXMED WILLOW AMBULATORY MEDICATION CHARGE: Performed by: NURSE PRACTITIONER

## 2022-07-29 ENCOUNTER — PHARMACY VISIT (OUTPATIENT)
Dept: PHARMACY | Facility: MEDICAL CENTER | Age: 62
End: 2022-07-29
Payer: COMMERCIAL

## 2022-08-21 ENCOUNTER — HOSPITAL ENCOUNTER (OUTPATIENT)
Facility: MEDICAL CENTER | Age: 62
End: 2022-08-21
Attending: NURSE PRACTITIONER
Payer: MEDICAID

## 2022-08-21 PROCEDURE — 82274 ASSAY TEST FOR BLOOD FECAL: CPT

## 2022-08-25 DIAGNOSIS — Z12.12 SCREENING FOR COLORECTAL CANCER: ICD-10-CM

## 2022-08-25 DIAGNOSIS — Z12.11 SCREENING FOR COLORECTAL CANCER: ICD-10-CM

## 2022-08-26 ENCOUNTER — PHARMACY VISIT (OUTPATIENT)
Dept: PHARMACY | Facility: MEDICAL CENTER | Age: 62
End: 2022-08-26
Payer: COMMERCIAL

## 2022-08-26 LAB — IMM ASSAY OCC BLD FITOB: NEGATIVE

## 2022-08-26 PROCEDURE — RXMED WILLOW AMBULATORY MEDICATION CHARGE: Performed by: NURSE PRACTITIONER

## 2022-08-30 NOTE — RESULT ENCOUNTER NOTE
Please call pt and let them know Fit test was negative for blood in your stool, we will repeat this test again next year.

## 2022-09-15 ENCOUNTER — OFFICE VISIT (OUTPATIENT)
Dept: MEDICAL GROUP | Facility: MEDICAL CENTER | Age: 62
End: 2022-09-15
Attending: NURSE PRACTITIONER
Payer: MEDICAID

## 2022-09-15 VITALS
OXYGEN SATURATION: 95 % | SYSTOLIC BLOOD PRESSURE: 148 MMHG | WEIGHT: 214.8 LBS | DIASTOLIC BLOOD PRESSURE: 82 MMHG | BODY MASS INDEX: 25.36 KG/M2 | RESPIRATION RATE: 16 BRPM | HEIGHT: 77 IN | HEART RATE: 76 BPM | TEMPERATURE: 97.5 F

## 2022-09-15 DIAGNOSIS — R79.89 LOW TESTOSTERONE: ICD-10-CM

## 2022-09-15 DIAGNOSIS — F41.9 ANXIETY: ICD-10-CM

## 2022-09-15 PROCEDURE — 99213 OFFICE O/P EST LOW 20 MIN: CPT | Performed by: NURSE PRACTITIONER

## 2022-09-15 PROCEDURE — 99214 OFFICE O/P EST MOD 30 MIN: CPT | Performed by: NURSE PRACTITIONER

## 2022-09-15 PROCEDURE — 700111 HCHG RX REV CODE 636 W/ 250 OVERRIDE (IP): Performed by: NURSE PRACTITIONER

## 2022-09-15 RX ORDER — ALPRAZOLAM 0.5 MG/1
0.5 TABLET ORAL 3 TIMES DAILY PRN
Qty: 90 TABLET | Refills: 0 | Status: SHIPPED | OUTPATIENT
Start: 2022-11-25 | End: 2022-12-28

## 2022-09-15 RX ORDER — ALPRAZOLAM 0.5 MG/1
0.5 TABLET ORAL 3 TIMES DAILY PRN
Qty: 90 TABLET | Refills: 0 | Status: SHIPPED | OUTPATIENT
Start: 2022-10-26 | End: 2022-11-25

## 2022-09-15 RX ORDER — TESTOSTERONE CYPIONATE 200 MG/ML
100 INJECTION, SOLUTION INTRAMUSCULAR ONCE
Status: COMPLETED | OUTPATIENT
Start: 2022-09-15 | End: 2022-09-15

## 2022-09-15 RX ADMIN — TESTOSTERONE CYPIONATE 100 MG: 200 INJECTION, SOLUTION INTRAMUSCULAR at 11:53

## 2022-09-15 ASSESSMENT — ENCOUNTER SYMPTOMS
SHORTNESS OF BREATH: 0
CHILLS: 0
WHEEZING: 0
COUGH: 0
WEIGHT LOSS: 0
DIARRHEA: 0
CONSTIPATION: 0
ABDOMINAL PAIN: 0
FEVER: 0
PALPITATIONS: 0
BLOOD IN STOOL: 0

## 2022-09-15 ASSESSMENT — FIBROSIS 4 INDEX: FIB4 SCORE: 2.02

## 2022-09-15 NOTE — ASSESSMENT & PLAN NOTE
Patient would like to continue to get his testosterone injections every 2 weeks.  Unfortunately we have been out for a few weeks but we have it today.

## 2022-09-15 NOTE — PROGRESS NOTES
Chief Complaint   Patient presents with    Follow-Up     Low testosterone       Subjective:     HPI:   Sammy Romo is a 62 y.o. male here to discuss the evaluation and management of:        Anxiety  Patient reports he continues to benefit from Xanax 0.5 3 times daily.  He denies side effects.    Low testosterone  Patient would like to continue to get his testosterone injections every 2 weeks.  Unfortunately we have been out for a few weeks but we have it today.      ROS  Review of Systems   Constitutional:  Negative for chills, fever, malaise/fatigue and weight loss.   Respiratory:  Negative for cough, shortness of breath and wheezing.    Cardiovascular:  Negative for chest pain, palpitations and leg swelling.   Gastrointestinal:  Negative for abdominal pain, blood in stool, constipation and diarrhea.       No Known Allergies    Current medicines (including changes today)  Current Outpatient Medications   Medication Sig Dispense Refill    [START ON 10/26/2022] ALPRAZolam (XANAX) 0.5 MG Tab Take 1 Tablet by mouth 3 times a day as needed for Sleep or Anxiety for up to 30 days. 90 Tablet 0    [START ON 11/25/2022] ALPRAZolam (XANAX) 0.5 MG Tab Take 1 Tablet by mouth 3 times a day as needed for Sleep or Anxiety for up to 30 days. 90 Tablet 0    ALPRAZolam (XANAX) 0.5 MG Tab Take 1 Tablet by mouth 3 times a day as needed for Anxiety for up to 30 days.Ok to fill 8/28/22 90 Tablet 0    [START ON 9/27/2022] ALPRAZolam (XANAX) 0.5 MG Tab Take 1 Tablet by mouth 3 times a day as needed for Anxiety for up to 30 days.Ok to fill 9/27/22 90 Tablet 0    atorvastatin (LIPITOR) 20 MG Tab Take 1 Tablet by mouth every day. 90 Tablet 3    lisinopril (PRINIVIL) 30 MG tablet Take 1 Tablet by mouth every day for high blood pressure 90 Tablet 3    cyclobenzaprine (FLEXERIL) 10 mg Tab Take 1 Tab by mouth 3 times a day as needed. 90 Tablet 5    vitamin D2, Ergocalciferol, (DRISDOL) 1.25 MG (68716 UT) Cap capsule Take 1 Capsule by mouth every  "14 days. 6 Capsule 1    gabapentin (NEURONTIN) 300 MG Cap Take 1 capsule by mouth every bedtime. 30 Capsule 11     Current Facility-Administered Medications   Medication Dose Route Frequency Provider Last Rate Last Admin    testosterone cypionate (DEPO-TESTOSTERONE) injection 50 mg  50 mg Intramuscular Q14 DAYS ANDREAS Peters.           Social History     Tobacco Use    Smoking status: Never    Smokeless tobacco: Never   Vaping Use    Vaping Use: Never used   Substance Use Topics    Alcohol use: Yes     Comment: socially, moderate    Drug use: No       Patient Active Problem List    Diagnosis Date Noted    Mass of left upper extremity 03/29/2022    Low testosterone 06/15/2021    Erectile dysfunction 05/19/2021    Vitamin D deficiency 06/11/2020    Post-traumatic stress disorder, unspecified 05/04/2020    Anxiety 12/05/2019    Bilateral impacted cerumen 12/05/2019    Major depressive disorder 11/06/2019    Encounter to establish care 09/04/2019    Essential hypertension 09/04/2019    Chronic midline low back pain with right-sided sciatica 09/04/2019    Obesity (BMI 30-39.9) 09/04/2019       Family History   Problem Relation Age of Onset    Hypertension Maternal Grandfather     Heart Disease Maternal Grandfather           Objective:     BP (!) 148/82   Pulse 76   Temp 36.4 °C (97.5 °F)   Resp 16   Ht 1.956 m (6' 5.01\")   Wt 97.4 kg (214 lb 12.8 oz)   SpO2 95%  Body mass index is 25.47 kg/m².    Physical Exam:  Physical Exam  Constitutional:       General: He is not in acute distress.  HENT:      Head: Normocephalic.      Right Ear: Tympanic membrane and external ear normal.      Left Ear: Tympanic membrane and external ear normal.   Eyes:      Conjunctiva/sclera: Conjunctivae normal.      Pupils: Pupils are equal, round, and reactive to light.   Neck:      Trachea: No tracheal deviation.   Cardiovascular:      Rate and Rhythm: Normal rate and regular rhythm.      Heart sounds: Normal heart sounds. "   Pulmonary:      Effort: Pulmonary effort is normal.      Breath sounds: Normal breath sounds.   Abdominal:      General: Bowel sounds are normal.      Palpations: Abdomen is soft.   Musculoskeletal:         General: Normal range of motion.      Cervical back: Normal range of motion and neck supple.   Lymphadenopathy:      Head:      Right side of head: No preauricular adenopathy.      Left side of head: No preauricular adenopathy.      Cervical: No cervical adenopathy.   Skin:     General: Skin is warm and dry.   Neurological:      Mental Status: He is alert and oriented to person, place, and time.      Sensory: Sensation is intact.      Gait: Gait is intact.   Psychiatric:         Mood and Affect: Mood and affect normal.         Judgment: Judgment normal.     I have placed the below orders and discussed them with an approved delegating provider.  The MA is performing the below orders under the direction of Dr. Tompkins.    Assessment and Plan:     The following treatment plan was discussed:    1. Low testosterone  testosterone cypionate (DEPO-TESTOSTERONE) injection 100 mg  -Chronic problem, unstable.  Unfortunately we been out of testosterone for several weeks so we will restart now.  We will plan to recheck labs in approximately 3 months.      2. Anxiety  ALPRAZolam (XANAX) 0.5 MG Tab    ALPRAZolam (XANAX) 0.5 MG Tab  -Chronic problem, stable.  Medication refill.  Patient has 3-month supply of Xanax. Potential side effects and discontinuation criteria were discussed with patient, patient verbalized understanding. PDMP reviewed.            Any change or worsening of signs or symptoms, patient encouraged to follow-up or report to emergency room for further evaluation. Patient verbalizes understanding and agrees.    Follow-Up: Return in about 3 months (around 12/15/2022) for Controlled substance management.      PLEASE NOTE: This dictation was created using voice recognition software. I have made every reasonable  attempt to correct obvious errors, but I expect that there are errors of grammar and possibly content that I did not discover before finalizing the note.

## 2022-09-23 ENCOUNTER — PHARMACY VISIT (OUTPATIENT)
Dept: PHARMACY | Facility: MEDICAL CENTER | Age: 62
End: 2022-09-23
Payer: COMMERCIAL

## 2022-09-23 PROCEDURE — RXMED WILLOW AMBULATORY MEDICATION CHARGE: Performed by: NURSE PRACTITIONER

## 2022-10-26 ENCOUNTER — PHARMACY VISIT (OUTPATIENT)
Dept: PHARMACY | Facility: MEDICAL CENTER | Age: 62
End: 2022-10-26
Payer: COMMERCIAL

## 2022-10-26 PROCEDURE — RXMED WILLOW AMBULATORY MEDICATION CHARGE: Performed by: NURSE PRACTITIONER

## 2022-11-23 PROCEDURE — RXMED WILLOW AMBULATORY MEDICATION CHARGE: Performed by: NURSE PRACTITIONER

## 2022-11-28 ENCOUNTER — PHARMACY VISIT (OUTPATIENT)
Dept: PHARMACY | Facility: MEDICAL CENTER | Age: 62
End: 2022-11-28
Payer: COMMERCIAL

## 2022-11-28 PROCEDURE — RXMED WILLOW AMBULATORY MEDICATION CHARGE: Performed by: NURSE PRACTITIONER

## 2022-12-27 DIAGNOSIS — F41.9 ANXIETY: ICD-10-CM

## 2022-12-27 RX ORDER — ALPRAZOLAM 0.5 MG/1
0.5 TABLET ORAL 3 TIMES DAILY PRN
Qty: 90 TABLET | Refills: 0 | OUTPATIENT
Start: 2022-12-27 | End: 2023-01-26

## 2023-01-18 ENCOUNTER — TELEPHONE (OUTPATIENT)
Dept: MEDICAL GROUP | Facility: MEDICAL CENTER | Age: 63
End: 2023-01-18

## 2023-01-18 PROCEDURE — RXMED WILLOW AMBULATORY MEDICATION CHARGE: Performed by: NURSE PRACTITIONER

## 2023-01-18 NOTE — TELEPHONE ENCOUNTER
Patient called, stated he has been trying to get his medication, Xanax, but it has been denied. Patient states he is now starting to have some type of seizure due to not having his medication. I notified patient that it was denied because he needs an appointment. Patient stated he already had one scheduled. I notified patient we could get him in sooner since probably his medication would be approved until he has an appointment but he said he already had an appointment scheduled. Patient stated he has already told his family about this medication, so if anything happens to him, its malpractice on our end for just cutting him off. I notified patient to hold on the phone so I could get a medical assistant to talk to him but he said no, he just wanted me to pass on the message. I told patient to this sounded like a threat, saying if anything happened to him, and he said yes, yes it is.  I was trying to get him to stay on the line so I could have someone speak to him, but patient hung up.

## 2023-01-19 PROCEDURE — RXMED WILLOW AMBULATORY MEDICATION CHARGE: Performed by: NURSE PRACTITIONER

## 2023-01-19 NOTE — TELEPHONE ENCOUNTER
Spoke to pt, he was not threatening just upset that he did nti get a refill. Telephone encounter completed.  Wed iscussed strategies to get this messages to a provider if he feels like he is not getting through- verbalized understanding.

## 2023-01-20 ENCOUNTER — PHARMACY VISIT (OUTPATIENT)
Dept: PHARMACY | Facility: MEDICAL CENTER | Age: 63
End: 2023-01-20
Payer: COMMERCIAL

## 2023-03-01 ENCOUNTER — PHARMACY VISIT (OUTPATIENT)
Dept: PHARMACY | Facility: MEDICAL CENTER | Age: 63
End: 2023-03-01
Payer: COMMERCIAL

## 2023-03-01 PROCEDURE — RXMED WILLOW AMBULATORY MEDICATION CHARGE: Performed by: NURSE PRACTITIONER

## 2023-06-21 PROCEDURE — RXMED WILLOW AMBULATORY MEDICATION CHARGE: Performed by: NURSE PRACTITIONER

## 2023-06-22 ENCOUNTER — PHARMACY VISIT (OUTPATIENT)
Dept: PHARMACY | Facility: MEDICAL CENTER | Age: 63
End: 2023-06-22
Payer: COMMERCIAL

## 2023-09-20 NOTE — TELEPHONE ENCOUNTER
Pt needs a refill on atorvastatin sent over asap to the pharmacy, he has 1 tab left and would like to  tomorrow if possible    Benzoyl Peroxide Pregnancy And Lactation Text: This medication is Pregnancy Category C. It is unknown if benzoyl peroxide is excreted in breast milk.

## 2023-12-11 DIAGNOSIS — G89.29 CHRONIC MIDLINE LOW BACK PAIN WITH RIGHT-SIDED SCIATICA: ICD-10-CM

## 2023-12-11 DIAGNOSIS — M54.41 CHRONIC MIDLINE LOW BACK PAIN WITH RIGHT-SIDED SCIATICA: ICD-10-CM

## 2023-12-11 DIAGNOSIS — E78.5 HYPERLIPIDEMIA, UNSPECIFIED HYPERLIPIDEMIA TYPE: ICD-10-CM

## 2023-12-11 DIAGNOSIS — I10 ESSENTIAL HYPERTENSION: ICD-10-CM

## 2023-12-11 RX ORDER — CYCLOBENZAPRINE HCL 10 MG
10 TABLET ORAL 3 TIMES DAILY PRN
Qty: 90 TABLET | Refills: 5 | OUTPATIENT
Start: 2023-12-11

## 2023-12-11 RX ORDER — GABAPENTIN 300 MG/1
300 CAPSULE ORAL
Qty: 30 CAPSULE | Refills: 11 | OUTPATIENT
Start: 2023-12-11

## 2023-12-11 RX ORDER — ATORVASTATIN CALCIUM 20 MG/1
20 TABLET, FILM COATED ORAL DAILY
Qty: 90 TABLET | Refills: 3 | OUTPATIENT
Start: 2023-12-11

## 2023-12-14 RX ORDER — LISINOPRIL 30 MG/1
30 TABLET ORAL DAILY
Qty: 90 TABLET | Refills: 3 | Status: SHIPPED | OUTPATIENT
Start: 2023-12-14

## 2024-07-30 NOTE — ASSESSMENT & PLAN NOTE
Colonoscopy at [facility]   He continues to benefit from his xanax without side effects.